# Patient Record
Sex: MALE | Race: WHITE | NOT HISPANIC OR LATINO | Employment: FULL TIME | URBAN - METROPOLITAN AREA
[De-identification: names, ages, dates, MRNs, and addresses within clinical notes are randomized per-mention and may not be internally consistent; named-entity substitution may affect disease eponyms.]

---

## 2017-11-15 ENCOUNTER — APPOINTMENT (OUTPATIENT)
Dept: SOCIAL WORK | Facility: CLINIC | Age: 47
End: 2017-11-15

## 2017-11-15 PROCEDURE — 90686 IIV4 VACC NO PRSV 0.5 ML IM: CPT | Performed by: REGISTERED NURSE

## 2018-01-29 ENCOUNTER — HOSPITAL ENCOUNTER (INPATIENT)
Facility: MEDICAL CENTER | Age: 48
LOS: 1 days | DRG: 871 | End: 2018-01-29
Attending: EMERGENCY MEDICINE | Admitting: HOSPITALIST

## 2018-01-29 ENCOUNTER — APPOINTMENT (OUTPATIENT)
Dept: RADIOLOGY | Facility: MEDICAL CENTER | Age: 48
DRG: 871 | End: 2018-01-29
Attending: EMERGENCY MEDICINE

## 2018-01-29 ENCOUNTER — RESOLUTE PROFESSIONAL BILLING HOSPITAL PROF FEE (OUTPATIENT)
Dept: HOSPITALIST | Facility: MEDICAL CENTER | Age: 48
End: 2018-01-29

## 2018-01-29 VITALS
DIASTOLIC BLOOD PRESSURE: 104 MMHG | OXYGEN SATURATION: 92 % | TEMPERATURE: 100.2 F | SYSTOLIC BLOOD PRESSURE: 167 MMHG | BODY MASS INDEX: 24.05 KG/M2 | HEART RATE: 112 BPM | HEIGHT: 74 IN | RESPIRATION RATE: 16 BRPM | WEIGHT: 187.39 LBS

## 2018-01-29 DIAGNOSIS — J45.901 ASTHMA WITH ACUTE EXACERBATION, UNSPECIFIED ASTHMA SEVERITY, UNSPECIFIED WHETHER PERSISTENT: ICD-10-CM

## 2018-01-29 DIAGNOSIS — J18.9 COMMUNITY ACQUIRED PNEUMONIA, UNSPECIFIED LATERALITY: ICD-10-CM

## 2018-01-29 PROBLEM — A41.9 SEPSIS (HCC): Status: ACTIVE | Noted: 2018-01-29

## 2018-01-29 LAB
ALBUMIN SERPL BCP-MCNC: 4.2 G/DL (ref 3.2–4.9)
ALBUMIN/GLOB SERPL: 1.3 G/DL
ALP SERPL-CCNC: 70 U/L (ref 30–99)
ALT SERPL-CCNC: 19 U/L (ref 2–50)
ANION GAP SERPL CALC-SCNC: 9 MMOL/L (ref 0–11.9)
AST SERPL-CCNC: 23 U/L (ref 12–45)
BASOPHILS # BLD AUTO: 1 % (ref 0–1.8)
BASOPHILS # BLD: 0.09 K/UL (ref 0–0.12)
BILIRUB SERPL-MCNC: 0.7 MG/DL (ref 0.1–1.5)
BNP SERPL-MCNC: 77 PG/ML (ref 0–100)
BUN SERPL-MCNC: 9 MG/DL (ref 8–22)
CALCIUM SERPL-MCNC: 9.2 MG/DL (ref 8.4–10.2)
CHLORIDE SERPL-SCNC: 101 MMOL/L (ref 96–112)
CO2 SERPL-SCNC: 27 MMOL/L (ref 20–33)
CREAT SERPL-MCNC: 1.09 MG/DL (ref 0.5–1.4)
EKG IMPRESSION: NORMAL
EOSINOPHIL # BLD AUTO: 0.51 K/UL (ref 0–0.51)
EOSINOPHIL NFR BLD: 5.7 % (ref 0–6.9)
ERYTHROCYTE [DISTWIDTH] IN BLOOD BY AUTOMATED COUNT: 41.4 FL (ref 35.9–50)
GLOBULIN SER CALC-MCNC: 3.3 G/DL (ref 1.9–3.5)
GLUCOSE SERPL-MCNC: 164 MG/DL (ref 65–99)
HCT VFR BLD AUTO: 47.3 % (ref 42–52)
HGB BLD-MCNC: 16 G/DL (ref 14–18)
IMM GRANULOCYTES # BLD AUTO: 0.06 K/UL (ref 0–0.11)
IMM GRANULOCYTES NFR BLD AUTO: 0.7 % (ref 0–0.9)
LIPASE SERPL-CCNC: 12 U/L (ref 7–58)
LYMPHOCYTES # BLD AUTO: 0.59 K/UL (ref 1–4.8)
LYMPHOCYTES NFR BLD: 6.6 % (ref 22–41)
MCH RBC QN AUTO: 29.6 PG (ref 27–33)
MCHC RBC AUTO-ENTMCNC: 33.8 G/DL (ref 33.7–35.3)
MCV RBC AUTO: 87.6 FL (ref 81.4–97.8)
MONOCYTES # BLD AUTO: 0.58 K/UL (ref 0–0.85)
MONOCYTES NFR BLD AUTO: 6.5 % (ref 0–13.4)
NEUTROPHILS # BLD AUTO: 7.05 K/UL (ref 1.82–7.42)
NEUTROPHILS NFR BLD: 79.5 % (ref 44–72)
NRBC # BLD AUTO: 0 K/UL
NRBC BLD-RTO: 0 /100 WBC
PLATELET # BLD AUTO: 246 K/UL (ref 164–446)
PMV BLD AUTO: 10.2 FL (ref 9–12.9)
POTASSIUM SERPL-SCNC: 3.8 MMOL/L (ref 3.6–5.5)
PROT SERPL-MCNC: 7.5 G/DL (ref 6–8.2)
RBC # BLD AUTO: 5.4 M/UL (ref 4.7–6.1)
SODIUM SERPL-SCNC: 137 MMOL/L (ref 135–145)
TROPONIN I SERPL-MCNC: <0.02 NG/ML (ref 0–0.04)
WBC # BLD AUTO: 8.9 K/UL (ref 4.8–10.8)

## 2018-01-29 PROCEDURE — 94760 N-INVAS EAR/PLS OXIMETRY 1: CPT

## 2018-01-29 PROCEDURE — 83690 ASSAY OF LIPASE: CPT

## 2018-01-29 PROCEDURE — 700101 HCHG RX REV CODE 250

## 2018-01-29 PROCEDURE — 87040 BLOOD CULTURE FOR BACTERIA: CPT

## 2018-01-29 PROCEDURE — 85025 COMPLETE CBC W/AUTO DIFF WBC: CPT

## 2018-01-29 PROCEDURE — 700105 HCHG RX REV CODE 258: Performed by: EMERGENCY MEDICINE

## 2018-01-29 PROCEDURE — 84145 PROCALCITONIN (PCT): CPT

## 2018-01-29 PROCEDURE — 84484 ASSAY OF TROPONIN QUANT: CPT

## 2018-01-29 PROCEDURE — 36415 COLL VENOUS BLD VENIPUNCTURE: CPT

## 2018-01-29 PROCEDURE — 304561 HCHG STAT O2

## 2018-01-29 PROCEDURE — 71045 X-RAY EXAM CHEST 1 VIEW: CPT

## 2018-01-29 PROCEDURE — 94640 AIRWAY INHALATION TREATMENT: CPT

## 2018-01-29 PROCEDURE — 83880 ASSAY OF NATRIURETIC PEPTIDE: CPT

## 2018-01-29 PROCEDURE — 99223 1ST HOSP IP/OBS HIGH 75: CPT | Performed by: HOSPITALIST

## 2018-01-29 PROCEDURE — 99284 EMERGENCY DEPT VISIT MOD MDM: CPT

## 2018-01-29 PROCEDURE — 80053 COMPREHEN METABOLIC PANEL: CPT

## 2018-01-29 PROCEDURE — 93005 ELECTROCARDIOGRAM TRACING: CPT

## 2018-01-29 PROCEDURE — 700111 HCHG RX REV CODE 636 W/ 250 OVERRIDE (IP): Performed by: EMERGENCY MEDICINE

## 2018-01-29 PROCEDURE — 96374 THER/PROPH/DIAG INJ IV PUSH: CPT

## 2018-01-29 PROCEDURE — 93005 ELECTROCARDIOGRAM TRACING: CPT | Performed by: EMERGENCY MEDICINE

## 2018-01-29 PROCEDURE — 306637 HCHG MISC ORTHO ITEM RC 0274

## 2018-01-29 RX ORDER — PROMETHAZINE HYDROCHLORIDE 25 MG/1
12.5-25 TABLET ORAL EVERY 4 HOURS PRN
Status: DISCONTINUED | OUTPATIENT
Start: 2018-01-29 | End: 2018-01-29 | Stop reason: HOSPADM

## 2018-01-29 RX ORDER — ONDANSETRON 4 MG/1
4 TABLET, ORALLY DISINTEGRATING ORAL EVERY 4 HOURS PRN
Status: DISCONTINUED | OUTPATIENT
Start: 2018-01-29 | End: 2018-01-29 | Stop reason: HOSPADM

## 2018-01-29 RX ORDER — SODIUM CHLORIDE 9 MG/ML
INJECTION, SOLUTION INTRAVENOUS CONTINUOUS
Status: DISCONTINUED | OUTPATIENT
Start: 2018-01-29 | End: 2018-01-29 | Stop reason: HOSPADM

## 2018-01-29 RX ORDER — ONDANSETRON 2 MG/ML
4 INJECTION INTRAMUSCULAR; INTRAVENOUS EVERY 4 HOURS PRN
Status: DISCONTINUED | OUTPATIENT
Start: 2018-01-29 | End: 2018-01-29 | Stop reason: HOSPADM

## 2018-01-29 RX ORDER — POLYETHYLENE GLYCOL 3350 17 G/17G
1 POWDER, FOR SOLUTION ORAL
Status: DISCONTINUED | OUTPATIENT
Start: 2018-01-29 | End: 2018-01-29 | Stop reason: HOSPADM

## 2018-01-29 RX ORDER — GUAIFENESIN/DEXTROMETHORPHAN 100-10MG/5
10 SYRUP ORAL EVERY 6 HOURS PRN
Status: DISCONTINUED | OUTPATIENT
Start: 2018-01-29 | End: 2018-01-29 | Stop reason: HOSPADM

## 2018-01-29 RX ORDER — ACETAMINOPHEN 325 MG/1
650 TABLET ORAL EVERY 6 HOURS PRN
Status: DISCONTINUED | OUTPATIENT
Start: 2018-01-29 | End: 2018-01-29 | Stop reason: HOSPADM

## 2018-01-29 RX ORDER — DOXYCYCLINE 100 MG/1
100 CAPSULE ORAL 2 TIMES DAILY
Qty: 20 CAP | Refills: 0 | Status: SHIPPED | OUTPATIENT
Start: 2018-01-29 | End: 2023-05-15

## 2018-01-29 RX ORDER — BISACODYL 10 MG
10 SUPPOSITORY, RECTAL RECTAL
Status: DISCONTINUED | OUTPATIENT
Start: 2018-01-29 | End: 2018-01-29 | Stop reason: HOSPADM

## 2018-01-29 RX ORDER — AMOXICILLIN 250 MG
2 CAPSULE ORAL 2 TIMES DAILY
Status: DISCONTINUED | OUTPATIENT
Start: 2018-01-29 | End: 2018-01-29 | Stop reason: HOSPADM

## 2018-01-29 RX ORDER — AZITHROMYCIN 250 MG/1
500 TABLET, FILM COATED ORAL ONCE
Status: DISCONTINUED | OUTPATIENT
Start: 2018-01-29 | End: 2018-01-29 | Stop reason: HOSPADM

## 2018-01-29 RX ORDER — IPRATROPIUM BROMIDE AND ALBUTEROL SULFATE 2.5; .5 MG/3ML; MG/3ML
SOLUTION RESPIRATORY (INHALATION)
Status: COMPLETED
Start: 2018-01-29 | End: 2018-01-29

## 2018-01-29 RX ORDER — DEXTROSE MONOHYDRATE 100 MG/ML
INJECTION, SOLUTION INTRAVENOUS
Status: COMPLETED
Start: 2018-01-29 | End: 2018-01-29

## 2018-01-29 RX ORDER — AZITHROMYCIN 250 MG/1
250 TABLET, FILM COATED ORAL EVERY 24 HOURS
Status: DISCONTINUED | OUTPATIENT
Start: 2018-01-30 | End: 2018-01-29 | Stop reason: HOSPADM

## 2018-01-29 RX ORDER — PROMETHAZINE HYDROCHLORIDE 25 MG/1
12.5-25 SUPPOSITORY RECTAL EVERY 4 HOURS PRN
Status: DISCONTINUED | OUTPATIENT
Start: 2018-01-29 | End: 2018-01-29 | Stop reason: HOSPADM

## 2018-01-29 RX ORDER — SODIUM CHLORIDE 9 MG/ML
1000 INJECTION, SOLUTION INTRAVENOUS
Status: DISCONTINUED | OUTPATIENT
Start: 2018-01-29 | End: 2018-01-29 | Stop reason: HOSPADM

## 2018-01-29 RX ORDER — TEMAZEPAM 15 MG/1
15 CAPSULE ORAL
Status: DISCONTINUED | OUTPATIENT
Start: 2018-01-29 | End: 2018-01-29 | Stop reason: HOSPADM

## 2018-01-29 RX ORDER — SODIUM CHLORIDE 9 MG/ML
30 INJECTION, SOLUTION INTRAVENOUS
Status: DISCONTINUED | OUTPATIENT
Start: 2018-01-29 | End: 2018-01-29 | Stop reason: HOSPADM

## 2018-01-29 RX ORDER — CEFTRIAXONE 2 G/1
2 INJECTION, POWDER, FOR SOLUTION INTRAMUSCULAR; INTRAVENOUS ONCE
Status: DISCONTINUED | OUTPATIENT
Start: 2018-01-29 | End: 2018-01-29 | Stop reason: HOSPADM

## 2018-01-29 RX ORDER — SODIUM CHLORIDE 9 MG/ML
1000 INJECTION, SOLUTION INTRAVENOUS ONCE
Status: COMPLETED | OUTPATIENT
Start: 2018-01-29 | End: 2018-01-29

## 2018-01-29 RX ADMIN — IPRATROPIUM BROMIDE AND ALBUTEROL SULFATE 3 ML: .5; 3 SOLUTION RESPIRATORY (INHALATION) at 16:07

## 2018-01-29 RX ADMIN — SODIUM CHLORIDE 1000 ML: 9 INJECTION, SOLUTION INTRAVENOUS at 17:31

## 2018-01-29 ASSESSMENT — ENCOUNTER SYMPTOMS
PSYCHIATRIC NEGATIVE: 1
BRUISES/BLEEDS EASILY: 0
LOSS OF CONSCIOUSNESS: 0
DEPRESSION: 0
NERVOUS/ANXIOUS: 0
NEUROLOGICAL NEGATIVE: 1
GASTROINTESTINAL NEGATIVE: 1
ABDOMINAL PAIN: 0
CHILLS: 0
MYALGIAS: 0
HEMOPTYSIS: 0
VOMITING: 0
FEVER: 0
CARDIOVASCULAR NEGATIVE: 1
COUGH: 1
DIZZINESS: 0
WEIGHT LOSS: 0
SPUTUM PRODUCTION: 1
FLANK PAIN: 0
MUSCULOSKELETAL NEGATIVE: 1
SHORTNESS OF BREATH: 1
BACK PAIN: 0
WHEEZING: 1
NAUSEA: 0
WEAKNESS: 0
CONSTITUTIONAL NEGATIVE: 1

## 2018-01-29 ASSESSMENT — PAIN SCALES - GENERAL
PAINLEVEL_OUTOF10: 0
PAINLEVEL_OUTOF10: 6

## 2018-01-29 NOTE — ED NOTES
"Chief Complaint   Patient presents with   • Respiratory Distress     started two days ago   • Chest Pain     started two days ago, described as \"It hurts with all this breathing\"     Resp (!) 24   Ht 1.88 m (6' 2\")   SpO2 (!) 74%     Pt taken to Room 10 after EKG  "

## 2018-01-30 LAB — PROCALCITONIN SERPL-MCNC: 0.08 NG/ML

## 2018-01-30 NOTE — FLOWSHEET NOTE
01/29/18 1607   Interdisciplinary Plan of Care-Outcomes    Bronchodilator Outcome Diminished Wheezing and Volume of Air Movement Increased   Education   Education Yes - Pt. / Family has been Instructed in use of Respiratory Medications and Adverse Reactions   RT Assessment of Delivered Medications   Evaluation of Medication Delivery Daily Yes-- Pt /Family has been Instructed in use of Respiratory Medications and Adverse Reactions   SVN Group   #SVN Performed Yes   Given By: Mouthpiece   Respiratory WDL   Respiratory (WDL) X   Chest Exam   Respiration (!) 27   Pulse (!) 114   Heart Rate (Monitored) (!) 113   Breath Sounds   RUL Breath Sounds Clear   RML Breath Sounds Diminished   RLL Breath Sounds Diminished   VICKY Breath Sounds Clear   LLL Breath Sounds Diminished   Secretions   Cough Moist;Productive   How Sputum Obtained Spontaneous   Oximetry   #Pulse Oximetry (Single Determination) Yes   Continuous Oximetry Yes   Oxygen   Pulse Oximetry 95 %   O2 (LPM) 5   O2 Daily Delivery Respiratory  Silicone Nasal Cannula

## 2018-01-30 NOTE — ED NOTES
Pt assessed, reports feeling much better after RT. Call light within reach, will cont to monitor.

## 2018-01-30 NOTE — ED PROVIDER NOTES
"ED Provider Note    CHIEF COMPLAINT  Chief Complaint   Patient presents with   • Respiratory Distress     started two days ago   • Chest Pain     started two days ago, described as \"It hurts with all this breathing\"       HPI  Vishal Parrish is a 47 y.o. male here for evaluation of cough congestion, and fast heart rate. Patient states that he has had productive cough for approximately 2-3 days, but has no vomiting. He isn't having fevers at home, but no abdominal pain.    PAST MEDICAL HISTORY   has a past medical history of Asthma.    SOCIAL HISTORY  Social History     Social History Main Topics   • Smoking status: Never Smoker   • Smokeless tobacco: Not on file   • Alcohol use No   • Drug use: No   • Sexual activity: Not on file       SURGICAL HISTORY  patient denies any surgical history    CURRENT MEDICATIONS  Home Medications     Reviewed by Olivia Woods (Pharmacy Tech) on 01/29/18 at 1717  Med List Status: Complete   Medication Last Dose Status        Patient Leeroy Taking any Medications                       ALLERGIES  No Known Allergies    REVIEW OF SYSTEMS  See HPI for further details. Review of systems as above, otherwise all other systems are negative.     PHYSICAL EXAM  Constitutional: Well developed, well nourished. mild acute distress.  HEENT: Normocephalic, atraumatic. Posterior pharynx clear and moist.  Eyes:  EOMI. Normal sclera.  Neck: Supple, Full range of motion, nontender.  Chest/Pulmonary: clear to ausculation. Symmetrical expansion.   Cardio: tachycardic rate and rhythm with no murmur.   Abdomen: Soft, nontender. No peritoneal signs. No guarding. No palpable masses.  Back: No CVA tenderness, nontender midline, no step offs.  Musculoskeletal: No deformity, no edema, neurovascular intact.   Neuro: Clear speech, appropriate, cooperative, cranial nerves II-XII grossly intact.  Psych: Normal mood and affect    Results for orders placed or performed during the hospital encounter of 01/29/18 "   CBC w/ Differential   Result Value Ref Range    WBC 8.9 4.8 - 10.8 K/uL    RBC 5.40 4.70 - 6.10 M/uL    Hemoglobin 16.0 14.0 - 18.0 g/dL    Hematocrit 47.3 42.0 - 52.0 %    MCV 87.6 81.4 - 97.8 fL    MCH 29.6 27.0 - 33.0 pg    MCHC 33.8 33.7 - 35.3 g/dL    RDW 41.4 35.9 - 50.0 fL    Platelet Count 246 164 - 446 K/uL    MPV 10.2 9.0 - 12.9 fL    Neutrophils-Polys 79.50 (H) 44.00 - 72.00 %    Lymphocytes 6.60 (L) 22.00 - 41.00 %    Monocytes 6.50 0.00 - 13.40 %    Eosinophils 5.70 0.00 - 6.90 %    Basophils 1.00 0.00 - 1.80 %    Immature Granulocytes 0.70 0.00 - 0.90 %    Nucleated RBC 0.00 /100 WBC    Neutrophils (Absolute) 7.05 1.82 - 7.42 K/uL    Lymphs (Absolute) 0.59 (L) 1.00 - 4.80 K/uL    Monos (Absolute) 0.58 0.00 - 0.85 K/uL    Eos (Absolute) 0.51 0.00 - 0.51 K/uL    Baso (Absolute) 0.09 0.00 - 0.12 K/uL    Immature Granulocytes (abs) 0.06 0.00 - 0.11 K/uL    NRBC (Absolute) 0.00 K/uL   Complete Metabolic Panel (CMP)   Result Value Ref Range    Sodium 137 135 - 145 mmol/L    Potassium 3.8 3.6 - 5.5 mmol/L    Chloride 101 96 - 112 mmol/L    Co2 27 20 - 33 mmol/L    Anion Gap 9.0 0.0 - 11.9    Glucose 164 (H) 65 - 99 mg/dL    Bun 9 8 - 22 mg/dL    Creatinine 1.09 0.50 - 1.40 mg/dL    Calcium 9.2 8.4 - 10.2 mg/dL    AST(SGOT) 23 12 - 45 U/L    ALT(SGPT) 19 2 - 50 U/L    Alkaline Phosphatase 70 30 - 99 U/L    Total Bilirubin 0.7 0.1 - 1.5 mg/dL    Albumin 4.2 3.2 - 4.9 g/dL    Total Protein 7.5 6.0 - 8.2 g/dL    Globulin 3.3 1.9 - 3.5 g/dL    A-G Ratio 1.3 g/dL   Troponin STAT   Result Value Ref Range    Troponin I <0.02 0.00 - 0.04 ng/mL   Btype Natriuretic Peptide   Result Value Ref Range    B Natriuretic Peptide 77 0 - 100 pg/mL   Lipase   Result Value Ref Range    Lipase 12 7 - 58 U/L   ESTIMATED GFR   Result Value Ref Range    GFR If African American >60 >60 mL/min/1.73 m 2    GFR If Non African American >60 >60 mL/min/1.73 m 2   EKG (NOW)   Result Value Ref Range    Report       [facility]  [facility] Emergency Dept.    Test Date:  2018  Pt Name:    BILLY BILLINGS                Department: EDS  MRN:        [id number]                      Room:       -ROOM 10  Gender:     Male                         Technician: ABHISHEK  :        1970                   Requested By:ER TRIAGE PROTOCOL  Order #:    [id number]                    Reading MD:    Measurements  Intervals                                Axis  Rate:       119                          P:          75  NV:         161                          QRS:        96  QRSD:       98                           T:          -1  QT:         319  QTc:        449    Interpretive Statements  Sinus tachycardia  Borderline right axis deviation  Probable left ventricular hypertrophy  No previous ECG available for comparison       DX-CHEST-PORTABLE (1 VIEW)   Final Result      Improved right suprahilar patchy opacity could reflect new consolidation. There is some central bronchial wall thickening which supports a diagnosis of reactive airways disease/asthma            PROCEDURES     MEDICAL RECORD  I have reviewed patient's medical record and pertinent results are listed above.    COURSE & MEDICAL DECISION MAKING  I have reviewed any medical record information, laboratory studies and radiographic results as noted above.    CRITICAL CARE  The very real possibility of a deterioration of this patient's condition required the highest level of my preparedness for sudden, emergent intervention.  I provided critical care services, which included medication orders, frequent reevaluations of the patient's condition and response to treatment, ordering and reviewing test results, and discussing the case with various consultants.  The critical care time associated with the care of the patient was 35 minutes. Review chart for interventions. This time is exclusive of any other billable procedures.       5:46 PM  Patient will be admitted for pneumonia and respiratory  distress        FINAL IMPRESSION  1. Community acquired pneumonia, unspecified laterality    2.      Critical care time 35 minutes.    Electronically signed by: Nader Santos, 1/29/2018 5:44 PM

## 2018-01-30 NOTE — ASSESSMENT & PLAN NOTE
This is severe sepsis with the following associated acute organ dysfunction(s): acute respiratory failure.   Sepsis orders set completed.  Start IV Unasyn and oral azithromycin.  30 mL/kg bolus started in the emergency department. Continue normal saline at 125 mL per hour.  Blood cultures done in emergency department follow results.

## 2018-01-30 NOTE — ASSESSMENT & PLAN NOTE
Sepsis protocol.  RT protocol.  Treating asthma.  Antibiotics, steroids Unasyn and azithromycin.

## 2018-01-30 NOTE — ED NOTES
"ERP notified pt wants to leave AMA, pt given extensive education on the risks and still wants to leave \"because I have a lot going on.\" Pt 92% on room air, still tachycardic. Educated on reasons to return to ED and verbalized understanding. Paperwork signed and on chart.   "

## 2018-01-30 NOTE — H&P
" Hospital Medicine History and Physical    Date of Service  1/29/2018    Chief Complaint  Chief Complaint   Patient presents with   • Respiratory Distress     started two days ago   • Chest Pain     started two days ago, described as \"It hurts with all this breathing\"       History of Presenting Illness  47 y.o. male who presented 1/29/2018 with difficulty breathing and coughing. This started about 2 days ago. He says he is coughing up green sputum. He has pain with the coughing and deep breathing. He has not been in the hospital before for pneumonia or asthma and does not take any medications on a regular basis. Patient has been chilled but no fever.   Primary Care Physician  Pcp Unknown    Consultants  None.    Code Status  Full code.    Review of Systems  Review of Systems   Constitutional: Negative.  Negative for chills, fever, malaise/fatigue and weight loss.   HENT: Negative.    Respiratory: Positive for cough, sputum production, shortness of breath and wheezing. Negative for hemoptysis.    Cardiovascular: Negative.  Negative for chest pain and leg swelling.   Gastrointestinal: Negative.  Negative for abdominal pain, nausea and vomiting.   Genitourinary: Negative.  Negative for dysuria and flank pain.   Musculoskeletal: Negative.  Negative for back pain and myalgias.   Neurological: Negative.  Negative for dizziness, loss of consciousness and weakness.   Endo/Heme/Allergies: Negative.  Does not bruise/bleed easily.   Psychiatric/Behavioral: Negative.  Negative for depression. The patient is not nervous/anxious.    All other systems reviewed and are negative.       Past Medical History  Past Medical History:   Diagnosis Date   • Asthma        Surgical History  No past surgical history on file.    Medications  No current facility-administered medications on file prior to encounter.      No current outpatient prescriptions on file prior to encounter.       Family History  Family History   Problem Relation Age of " Onset   • No Known Problems Mother    • No Known Problems Father        Social History  Social History   Substance Use Topics   • Smoking status: Never Smoker   • Smokeless tobacco: Not on file   • Alcohol use No       Allergies  No Known Allergies     Physical Exam  Laboratory   Hemodynamics  Temp (24hrs), Av.9 °C (100.2 °F), Min:37.9 °C (100.2 °F), Max:37.9 °C (100.2 °F)   Temperature: 37.9 °C (100.2 °F)  Pulse  Av  Min: 114  Max: 124 Heart Rate (Monitored): (!) 116  Blood Pressure: (!) 167/104, NIBP: 157/91      Respiratory      Respiration: 15, Pulse Oximetry: 97 %, O2 Daily Delivery Respiratory : Silicone Nasal Cannula     Given By:: Mouthpiece, Work Of Breathing / Effort: Moderate  RUL Breath Sounds: Clear, RML Breath Sounds: Diminished, RLL Breath Sounds: Diminished, VICKY Breath Sounds: Clear, LLL Breath Sounds: Diminished    Physical Exam   Constitutional: He is oriented to person, place, and time. He appears well-developed and well-nourished. No distress.   HENT:   Head: Normocephalic and atraumatic.   Nose: Nose normal.   Mouth/Throat: Oropharynx is clear and moist. Mucous membranes are dry. No oropharyngeal exudate.   Eyes: Conjunctivae are normal. Right eye exhibits no discharge. Left eye exhibits no discharge. No scleral icterus.   Neck: Normal range of motion. Neck supple. No JVD present. No tracheal deviation present.   Cardiovascular: Normal rate, regular rhythm, normal heart sounds and intact distal pulses.    Pulses:       Radial pulses are 2+ on the right side, and 2+ on the left side.   Pulmonary/Chest: Effort normal. No stridor. No respiratory distress. He has wheezes (few distant anterior). He has rales (right lower lung field). He exhibits no tenderness.   Abdominal: Soft. Bowel sounds are normal. He exhibits no distension. There is no tenderness.   Musculoskeletal: He exhibits no edema or tenderness.   Lymphadenopathy:     He has no cervical adenopathy.   Neurological: He is alert  and oriented to person, place, and time. No cranial nerve deficit. He exhibits normal muscle tone.   Skin: Skin is warm and dry. No rash noted. He is not diaphoretic. No erythema. No pallor.   Psychiatric: He has a normal mood and affect. His behavior is normal.   Nursing note and vitals reviewed.      Recent Labs      01/29/18   1550   WBC  8.9   RBC  5.40   HEMOGLOBIN  16.0   HEMATOCRIT  47.3   MCV  87.6   MCH  29.6   MCHC  33.8   RDW  41.4   PLATELETCT  246   MPV  10.2     Recent Labs      01/29/18   1550   SODIUM  137   POTASSIUM  3.8   CHLORIDE  101   CO2  27   GLUCOSE  164*   BUN  9   CREATININE  1.09   CALCIUM  9.2     Recent Labs      01/29/18   1550   ALTSGPT  19   ASTSGOT  23   ALKPHOSPHAT  70   TBILIRUBIN  0.7   LIPASE  12   GLUCOSE  164*         Recent Labs      01/29/18   1550   BNPBTYPENAT  77         Lab Results   Component Value Date    TROPONINI <0.02 01/29/2018     Urinalysis:  No results found for: SPECGRAVITY, GLUCOSEUR, KETONES, NITRITE, WBCURINE, RBCURINE, BACTERIA, EPITHELCELL     Imaging  Chest xray:  Improved right suprahilar patchy opacity could reflect new consolidation. There is some central bronchial wall thickening which supports a diagnosis of reactive airways disease/asthma   Assessment/Plan     I anticipate this patient will require at least two midnights for appropriate medical management, necessitating inpatient admission.    Asthma- (present on admission)   Assessment & Plan    RT protocol, nebulizers as needed.  Improving with treatment does not need steroids at this time.        Sepsis (CMS-[facility])- (present on admission)   Assessment & Plan    This is severe sepsis with the following associated acute organ dysfunction(s): acute respiratory failure.   Sepsis orders set completed.  Start IV Unasyn and oral azithromycin.  30 mL/kg bolus started in the emergency department. Continue normal saline at 125 mL per hour.  Blood cultures done in emergency department follow results.         CAP (community acquired pneumonia)- (present on admission)   Assessment & Plan    Sepsis protocol.  RT protocol.  Treating asthma.  Antibiotics, steroids Unasyn and azithromycin.              VTE prophylaxis: SCDs.

## 2018-02-03 LAB
BACTERIA BLD CULT: NORMAL
BACTERIA BLD CULT: NORMAL
SIGNIFICANT IND 70042: NORMAL
SIGNIFICANT IND 70042: NORMAL
SITE SITE: NORMAL
SITE SITE: NORMAL
SOURCE SOURCE: NORMAL
SOURCE SOURCE: NORMAL

## 2023-05-15 ENCOUNTER — OFFICE VISIT (OUTPATIENT)
Dept: URGENT CARE | Facility: CLINIC | Age: 53
End: 2023-05-15
Payer: COMMERCIAL

## 2023-05-15 DIAGNOSIS — R03.0 ELEVATED BLOOD PRESSURE READING IN OFFICE WITH WHITE COAT SYNDROME, WITHOUT DIAGNOSIS OF HYPERTENSION: ICD-10-CM

## 2023-05-15 DIAGNOSIS — L08.9 INFECTED FINGER LACERATION, INITIAL ENCOUNTER: Primary | ICD-10-CM

## 2023-05-15 DIAGNOSIS — Z23 NEED FOR TETANUS BOOSTER: ICD-10-CM

## 2023-05-15 DIAGNOSIS — S61.219A INFECTED FINGER LACERATION, INITIAL ENCOUNTER: Primary | ICD-10-CM

## 2023-05-15 PROCEDURE — 90715 TDAP VACCINE 7 YRS/> IM: CPT | Performed by: NURSE PRACTITIONER

## 2023-05-15 PROCEDURE — 90471 IMMUNIZATION ADMIN: CPT | Performed by: NURSE PRACTITIONER

## 2023-05-15 PROCEDURE — 99203 OFFICE O/P NEW LOW 30 MIN: CPT | Mod: 25 | Performed by: NURSE PRACTITIONER

## 2023-05-15 RX ORDER — SULFAMETHOXAZOLE AND TRIMETHOPRIM 800; 160 MG/1; MG/1
1 TABLET ORAL 2 TIMES DAILY
Qty: 14 TABLET | Refills: 0 | Status: SHIPPED | OUTPATIENT
Start: 2023-05-15 | End: 2023-05-22

## 2023-05-15 RX ORDER — CEPHALEXIN 500 MG/1
500 CAPSULE ORAL 3 TIMES DAILY
Qty: 21 CAPSULE | Refills: 0 | Status: SHIPPED | OUTPATIENT
Start: 2023-05-15 | End: 2023-05-22

## 2023-05-18 VITALS
OXYGEN SATURATION: 98 % | BODY MASS INDEX: 25.21 KG/M2 | RESPIRATION RATE: 16 BRPM | HEART RATE: 95 BPM | HEIGHT: 74 IN | TEMPERATURE: 97.9 F | WEIGHT: 196.4 LBS

## 2023-05-18 NOTE — PROGRESS NOTES
"Subjective:     Vishal Parrish is a 52 y.o. male who presents for Other (Right index finger, swelling, numbness, tingling sensation once in the while, pain)      Other      Pt presents for evaluation of a new problem. Vishal is a pleasant 52-year-old male presents urgent care today with complaints of an infected laceration after injuring himself 4 days ago.  He is now endorsing swelling, redness, pain and mild drainage.  His tetanus vaccination is not up-to-date.  He has not been using any medication for his treatment.    ROS    PMH:   Past Medical History:   Diagnosis Date    Asthma      ALLERGIES: No Known Allergies  SURGHX: No past surgical history on file.  SOCHX:   Social History     Socioeconomic History    Marital status: Single   Tobacco Use    Smoking status: Never   Substance and Sexual Activity    Alcohol use: No    Drug use: No     FH:   Family History   Problem Relation Age of Onset    No Known Problems Mother     No Known Problems Father          Objective:   Pulse 95   Temp 36.6 °C (97.9 °F) (Temporal)   Resp 16   Ht 1.88 m (6' 2\")   Wt 89.1 kg (196 lb 6.4 oz)   SpO2 98%   BMI 25.22 kg/m²   /84  Physical Exam  Vitals and nursing note reviewed.   Constitutional:       General: He is not in acute distress.     Appearance: Normal appearance. He is normal weight. He is not ill-appearing or toxic-appearing.   HENT:      Head: Normocephalic.      Right Ear: External ear normal.      Left Ear: External ear normal.      Nose: Nose normal.      Mouth/Throat:      Mouth: Mucous membranes are moist.   Eyes:      General:         Right eye: No discharge.         Left eye: No discharge.      Extraocular Movements: Extraocular movements intact.      Conjunctiva/sclera: Conjunctivae normal.      Pupils: Pupils are equal, round, and reactive to light.   Pulmonary:      Effort: Pulmonary effort is normal.      Breath sounds: Normal breath sounds.   Abdominal:      General: Abdomen is flat.   Musculoskeletal: "         General: Normal range of motion.      Cervical back: Normal range of motion and neck supple. No rigidity.   Lymphadenopathy:      Cervical: No cervical adenopathy.   Skin:     General: Skin is warm and dry.      Comments: Superficial laceration present to right index finger at PIP joint.  Laceration is approximately 3 cm in length and actively drainage.  There is pain with outpatient.  Range of motion decreased due to discomfort.   Neurological:      General: No focal deficit present.      Mental Status: He is alert and oriented to person, place, and time. Mental status is at baseline.   Psychiatric:         Mood and Affect: Mood normal.         Behavior: Behavior normal.         Judgment: Judgment normal.           Assessment/Plan:   Assessment    1. Infected finger laceration, initial encounter  cephALEXin (KEFLEX) 500 MG Cap    sulfamethoxazole-trimethoprim (BACTRIM DS) 800-160 MG tablet    Tdap =>8yo IM      2. Need for tetanus booster  Tdap =>8yo IM      3. Elevated blood pressure reading in office with white coat syndrome, without diagnosis of hypertension          Unfortunately, due to timeframe I am unable to repair laceration.  This will have to heal by secondary intent.  He is suffering from an infection.  Cephalexin and Bactrim sent to pharmacy for broad range coverage.  Wound was thoroughly cleansed with Hibiclens and normal saline and dressed with Polysporin, nonadherent gauze and Coban.  Patient to return for worsening or persistent symptoms.  AVS handout given and reviewed with patient. Pt educated on red flags and when to seek treatment back in ER or UC.

## 2023-06-22 ENCOUNTER — APPOINTMENT (OUTPATIENT)
Dept: RADIOLOGY | Facility: MEDICAL CENTER | Age: 53
End: 2023-06-22
Attending: EMERGENCY MEDICINE
Payer: COMMERCIAL

## 2023-06-22 ENCOUNTER — APPOINTMENT (OUTPATIENT)
Dept: RADIOLOGY | Facility: MEDICAL CENTER | Age: 53
DRG: 988 | End: 2023-06-22
Attending: EMERGENCY MEDICINE
Payer: COMMERCIAL

## 2023-06-22 ENCOUNTER — HOSPITAL ENCOUNTER (INPATIENT)
Facility: MEDICAL CENTER | Age: 53
LOS: 4 days | DRG: 988 | End: 2023-06-26
Attending: EMERGENCY MEDICINE | Admitting: INTERNAL MEDICINE
Payer: COMMERCIAL

## 2023-06-22 ENCOUNTER — HOSPITAL ENCOUNTER (EMERGENCY)
Facility: MEDICAL CENTER | Age: 53
End: 2023-06-22
Attending: EMERGENCY MEDICINE
Payer: COMMERCIAL

## 2023-06-22 VITALS
HEART RATE: 62 BPM | TEMPERATURE: 99 F | WEIGHT: 198.85 LBS | DIASTOLIC BLOOD PRESSURE: 87 MMHG | BODY MASS INDEX: 25.53 KG/M2 | RESPIRATION RATE: 18 BRPM | SYSTOLIC BLOOD PRESSURE: 140 MMHG | OXYGEN SATURATION: 99 %

## 2023-06-22 DIAGNOSIS — M10.9 ACUTE GOUT OF RIGHT HAND, UNSPECIFIED CAUSE: ICD-10-CM

## 2023-06-22 DIAGNOSIS — L03.119 CELLULITIS OF HAND: ICD-10-CM

## 2023-06-22 DIAGNOSIS — L03.119 CELLULITIS AND ABSCESS OF HAND: ICD-10-CM

## 2023-06-22 DIAGNOSIS — A41.9 SEPSIS, DUE TO UNSPECIFIED ORGANISM, UNSPECIFIED WHETHER ACUTE ORGAN DYSFUNCTION PRESENT (HCC): ICD-10-CM

## 2023-06-22 DIAGNOSIS — L02.519 CELLULITIS AND ABSCESS OF HAND: ICD-10-CM

## 2023-06-22 DIAGNOSIS — S63.501A SPRAIN OF RIGHT WRIST, INITIAL ENCOUNTER: ICD-10-CM

## 2023-06-22 LAB
ALBUMIN SERPL BCP-MCNC: 4.6 G/DL (ref 3.2–4.9)
ALBUMIN/GLOB SERPL: 1.7 G/DL
ALP SERPL-CCNC: 85 U/L (ref 30–99)
ALT SERPL-CCNC: 34 U/L (ref 2–50)
ANION GAP SERPL CALC-SCNC: 13 MMOL/L (ref 7–16)
AST SERPL-CCNC: 39 U/L (ref 12–45)
BASOPHILS # BLD AUTO: 0.3 % (ref 0–1.8)
BASOPHILS # BLD: 0.06 K/UL (ref 0–0.12)
BILIRUB SERPL-MCNC: 1 MG/DL (ref 0.1–1.5)
BUN SERPL-MCNC: 9 MG/DL (ref 8–22)
CALCIUM ALBUM COR SERPL-MCNC: 9.3 MG/DL (ref 8.5–10.5)
CALCIUM SERPL-MCNC: 9.8 MG/DL (ref 8.5–10.5)
CHLORIDE SERPL-SCNC: 97 MMOL/L (ref 96–112)
CK SERPL-CCNC: 367 U/L (ref 0–154)
CO2 SERPL-SCNC: 24 MMOL/L (ref 20–33)
CREAT SERPL-MCNC: 1.09 MG/DL (ref 0.5–1.4)
CRP SERPL HS-MCNC: 6.78 MG/DL (ref 0–0.75)
EOSINOPHIL # BLD AUTO: 0.02 K/UL (ref 0–0.51)
EOSINOPHIL NFR BLD: 0.1 % (ref 0–6.9)
ERYTHROCYTE [DISTWIDTH] IN BLOOD BY AUTOMATED COUNT: 41.8 FL (ref 35.9–50)
ERYTHROCYTE [SEDIMENTATION RATE] IN BLOOD BY WESTERGREN METHOD: 9 MM/HOUR (ref 0–20)
GFR SERPLBLD CREATININE-BSD FMLA CKD-EPI: 82 ML/MIN/1.73 M 2
GLOBULIN SER CALC-MCNC: 2.7 G/DL (ref 1.9–3.5)
GLUCOSE SERPL-MCNC: 122 MG/DL (ref 65–99)
HCT VFR BLD AUTO: 41.8 % (ref 42–52)
HGB BLD-MCNC: 14.9 G/DL (ref 14–18)
IMM GRANULOCYTES # BLD AUTO: 0.13 K/UL (ref 0–0.11)
IMM GRANULOCYTES NFR BLD AUTO: 0.6 % (ref 0–0.9)
LACTATE SERPL-SCNC: 1.8 MMOL/L (ref 0.5–2)
LYMPHOCYTES # BLD AUTO: 0.53 K/UL (ref 1–4.8)
LYMPHOCYTES NFR BLD: 2.6 % (ref 22–41)
MCH RBC QN AUTO: 31.7 PG (ref 27–33)
MCHC RBC AUTO-ENTMCNC: 35.6 G/DL (ref 32.3–36.5)
MCV RBC AUTO: 88.9 FL (ref 81.4–97.8)
MONOCYTES # BLD AUTO: 1.28 K/UL (ref 0–0.85)
MONOCYTES NFR BLD AUTO: 6.2 % (ref 0–13.4)
NEUTROPHILS # BLD AUTO: 18.56 K/UL (ref 1.82–7.42)
NEUTROPHILS NFR BLD: 90.2 % (ref 44–72)
NRBC # BLD AUTO: 0 K/UL
NRBC BLD-RTO: 0 /100 WBC (ref 0–0.2)
PLATELET # BLD AUTO: 231 K/UL (ref 164–446)
PMV BLD AUTO: 10.6 FL (ref 9–12.9)
POTASSIUM SERPL-SCNC: 3.5 MMOL/L (ref 3.6–5.5)
PROT SERPL-MCNC: 7.3 G/DL (ref 6–8.2)
RBC # BLD AUTO: 4.7 M/UL (ref 4.7–6.1)
SODIUM SERPL-SCNC: 134 MMOL/L (ref 135–145)
URATE SERPL-MCNC: 5.3 MG/DL (ref 2.5–8.3)
WBC # BLD AUTO: 20.6 K/UL (ref 4.8–10.8)

## 2023-06-22 PROCEDURE — 700111 HCHG RX REV CODE 636 W/ 250 OVERRIDE (IP): Performed by: EMERGENCY MEDICINE

## 2023-06-22 PROCEDURE — 84550 ASSAY OF BLOOD/URIC ACID: CPT

## 2023-06-22 PROCEDURE — 99223 1ST HOSP IP/OBS HIGH 75: CPT | Mod: 57 | Performed by: STUDENT IN AN ORGANIZED HEALTH CARE EDUCATION/TRAINING PROGRAM

## 2023-06-22 PROCEDURE — A9270 NON-COVERED ITEM OR SERVICE: HCPCS | Performed by: EMERGENCY MEDICINE

## 2023-06-22 PROCEDURE — 700102 HCHG RX REV CODE 250 W/ 637 OVERRIDE(OP): Performed by: EMERGENCY MEDICINE

## 2023-06-22 PROCEDURE — 770001 HCHG ROOM/CARE - MED/SURG/GYN PRIV*

## 2023-06-22 PROCEDURE — 700117 HCHG RX CONTRAST REV CODE 255: Performed by: EMERGENCY MEDICINE

## 2023-06-22 PROCEDURE — 83605 ASSAY OF LACTIC ACID: CPT

## 2023-06-22 PROCEDURE — 99222 1ST HOSP IP/OBS MODERATE 55: CPT | Performed by: INTERNAL MEDICINE

## 2023-06-22 PROCEDURE — 73130 X-RAY EXAM OF HAND: CPT | Mod: RT

## 2023-06-22 PROCEDURE — 85025 COMPLETE CBC W/AUTO DIFF WBC: CPT

## 2023-06-22 PROCEDURE — 700105 HCHG RX REV CODE 258: Performed by: INTERNAL MEDICINE

## 2023-06-22 PROCEDURE — 80053 COMPREHEN METABOLIC PANEL: CPT

## 2023-06-22 PROCEDURE — 85652 RBC SED RATE AUTOMATED: CPT

## 2023-06-22 PROCEDURE — 36415 COLL VENOUS BLD VENIPUNCTURE: CPT

## 2023-06-22 PROCEDURE — 73201 CT UPPER EXTREMITY W/DYE: CPT | Mod: RT

## 2023-06-22 PROCEDURE — 73100 X-RAY EXAM OF WRIST: CPT | Mod: RT

## 2023-06-22 PROCEDURE — 700102 HCHG RX REV CODE 250 W/ 637 OVERRIDE(OP): Performed by: INTERNAL MEDICINE

## 2023-06-22 PROCEDURE — 73090 X-RAY EXAM OF FOREARM: CPT | Mod: RT

## 2023-06-22 PROCEDURE — 99284 EMERGENCY DEPT VISIT MOD MDM: CPT | Mod: EDC

## 2023-06-22 PROCEDURE — 82550 ASSAY OF CK (CPK): CPT

## 2023-06-22 PROCEDURE — 96372 THER/PROPH/DIAG INJ SC/IM: CPT | Mod: EDC

## 2023-06-22 PROCEDURE — 96365 THER/PROPH/DIAG IV INF INIT: CPT

## 2023-06-22 PROCEDURE — 87040 BLOOD CULTURE FOR BACTERIA: CPT

## 2023-06-22 PROCEDURE — A9270 NON-COVERED ITEM OR SERVICE: HCPCS | Performed by: INTERNAL MEDICINE

## 2023-06-22 PROCEDURE — 700111 HCHG RX REV CODE 636 W/ 250 OVERRIDE (IP): Performed by: INTERNAL MEDICINE

## 2023-06-22 PROCEDURE — 96375 TX/PRO/DX INJ NEW DRUG ADDON: CPT

## 2023-06-22 PROCEDURE — 86140 C-REACTIVE PROTEIN: CPT

## 2023-06-22 PROCEDURE — 99285 EMERGENCY DEPT VISIT HI MDM: CPT

## 2023-06-22 RX ORDER — OXYCODONE HYDROCHLORIDE 5 MG/1
2.5 TABLET ORAL
Status: DISCONTINUED | OUTPATIENT
Start: 2023-06-22 | End: 2023-06-26 | Stop reason: HOSPADM

## 2023-06-22 RX ORDER — MORPHINE SULFATE 4 MG/ML
2 INJECTION INTRAVENOUS
Status: DISCONTINUED | OUTPATIENT
Start: 2023-06-22 | End: 2023-06-26 | Stop reason: HOSPADM

## 2023-06-22 RX ORDER — ACETAMINOPHEN 325 MG/1
650 TABLET ORAL EVERY 6 HOURS PRN
Status: DISCONTINUED | OUTPATIENT
Start: 2023-06-22 | End: 2023-06-26 | Stop reason: HOSPADM

## 2023-06-22 RX ORDER — NAPROXEN 500 MG/1
500 TABLET ORAL 2 TIMES DAILY WITH MEALS
Qty: 60 TABLET | Refills: 0 | Status: SHIPPED | OUTPATIENT
Start: 2023-06-22 | End: 2023-06-22

## 2023-06-22 RX ORDER — ENOXAPARIN SODIUM 100 MG/ML
40 INJECTION SUBCUTANEOUS DAILY
Status: DISCONTINUED | OUTPATIENT
Start: 2023-06-23 | End: 2023-06-26 | Stop reason: HOSPADM

## 2023-06-22 RX ORDER — OXYCODONE HYDROCHLORIDE 5 MG/1
5 TABLET ORAL
Status: DISCONTINUED | OUTPATIENT
Start: 2023-06-22 | End: 2023-06-26 | Stop reason: HOSPADM

## 2023-06-22 RX ORDER — BISACODYL 10 MG
10 SUPPOSITORY, RECTAL RECTAL
Status: DISCONTINUED | OUTPATIENT
Start: 2023-06-22 | End: 2023-06-26 | Stop reason: HOSPADM

## 2023-06-22 RX ORDER — PROMETHAZINE HYDROCHLORIDE 25 MG/1
12.5-25 SUPPOSITORY RECTAL EVERY 4 HOURS PRN
Status: DISCONTINUED | OUTPATIENT
Start: 2023-06-22 | End: 2023-06-26 | Stop reason: HOSPADM

## 2023-06-22 RX ORDER — ONDANSETRON 4 MG/1
4 TABLET, ORALLY DISINTEGRATING ORAL EVERY 4 HOURS PRN
Status: DISCONTINUED | OUTPATIENT
Start: 2023-06-22 | End: 2023-06-26 | Stop reason: HOSPADM

## 2023-06-22 RX ORDER — POLYETHYLENE GLYCOL 3350 17 G/17G
1 POWDER, FOR SOLUTION ORAL
Status: DISCONTINUED | OUTPATIENT
Start: 2023-06-22 | End: 2023-06-26 | Stop reason: HOSPADM

## 2023-06-22 RX ORDER — AMOXICILLIN 250 MG
2 CAPSULE ORAL 2 TIMES DAILY
Status: DISCONTINUED | OUTPATIENT
Start: 2023-06-23 | End: 2023-06-26 | Stop reason: HOSPADM

## 2023-06-22 RX ORDER — LABETALOL HYDROCHLORIDE 5 MG/ML
10 INJECTION, SOLUTION INTRAVENOUS EVERY 4 HOURS PRN
Status: DISCONTINUED | OUTPATIENT
Start: 2023-06-22 | End: 2023-06-26 | Stop reason: HOSPADM

## 2023-06-22 RX ORDER — KETOROLAC TROMETHAMINE 30 MG/ML
30 INJECTION, SOLUTION INTRAMUSCULAR; INTRAVENOUS ONCE
Status: COMPLETED | OUTPATIENT
Start: 2023-06-22 | End: 2023-06-22

## 2023-06-22 RX ORDER — CEFAZOLIN 2 G/1
2 INJECTION, POWDER, FOR SOLUTION INTRAMUSCULAR; INTRAVENOUS ONCE
Status: COMPLETED | OUTPATIENT
Start: 2023-06-22 | End: 2023-06-22

## 2023-06-22 RX ORDER — ONDANSETRON 2 MG/ML
4 INJECTION INTRAMUSCULAR; INTRAVENOUS EVERY 4 HOURS PRN
Status: DISCONTINUED | OUTPATIENT
Start: 2023-06-22 | End: 2023-06-26 | Stop reason: HOSPADM

## 2023-06-22 RX ORDER — OXYCODONE HYDROCHLORIDE AND ACETAMINOPHEN 5; 325 MG/1; MG/1
1 TABLET ORAL ONCE
Status: COMPLETED | OUTPATIENT
Start: 2023-06-22 | End: 2023-06-22

## 2023-06-22 RX ORDER — PROCHLORPERAZINE EDISYLATE 5 MG/ML
5-10 INJECTION INTRAMUSCULAR; INTRAVENOUS EVERY 4 HOURS PRN
Status: DISCONTINUED | OUTPATIENT
Start: 2023-06-22 | End: 2023-06-26 | Stop reason: HOSPADM

## 2023-06-22 RX ORDER — PROMETHAZINE HYDROCHLORIDE 25 MG/1
12.5-25 TABLET ORAL EVERY 4 HOURS PRN
Status: DISCONTINUED | OUTPATIENT
Start: 2023-06-22 | End: 2023-06-26 | Stop reason: HOSPADM

## 2023-06-22 RX ORDER — HYDROMORPHONE HYDROCHLORIDE 1 MG/ML
0.5 INJECTION, SOLUTION INTRAMUSCULAR; INTRAVENOUS; SUBCUTANEOUS ONCE
Status: COMPLETED | OUTPATIENT
Start: 2023-06-22 | End: 2023-06-22

## 2023-06-22 RX ORDER — POTASSIUM CHLORIDE 20 MEQ/1
40 TABLET, EXTENDED RELEASE ORAL ONCE
Status: COMPLETED | OUTPATIENT
Start: 2023-06-22 | End: 2023-06-22

## 2023-06-22 RX ADMIN — OXYCODONE HYDROCHLORIDE 5 MG: 5 TABLET ORAL at 22:03

## 2023-06-22 RX ADMIN — CEFAZOLIN 2 G: 2 INJECTION, POWDER, FOR SOLUTION INTRAMUSCULAR; INTRAVENOUS at 19:42

## 2023-06-22 RX ADMIN — HYDROMORPHONE HYDROCHLORIDE 0.5 MG: 1 INJECTION, SOLUTION INTRAMUSCULAR; INTRAVENOUS; SUBCUTANEOUS at 20:23

## 2023-06-22 RX ADMIN — OXYCODONE HYDROCHLORIDE AND ACETAMINOPHEN 1 TABLET: 5; 325 TABLET ORAL at 10:42

## 2023-06-22 RX ADMIN — IOHEXOL 100 ML: 350 INJECTION, SOLUTION INTRAVENOUS at 21:15

## 2023-06-22 RX ADMIN — OXYCODONE HYDROCHLORIDE AND ACETAMINOPHEN 1 TABLET: 5; 325 TABLET ORAL at 18:40

## 2023-06-22 RX ADMIN — POTASSIUM CHLORIDE 40 MEQ: 1500 TABLET, EXTENDED RELEASE ORAL at 21:58

## 2023-06-22 RX ADMIN — KETOROLAC TROMETHAMINE 30 MG: 30 INJECTION, SOLUTION INTRAMUSCULAR; INTRAVENOUS at 08:44

## 2023-06-22 RX ADMIN — VANCOMYCIN HYDROCHLORIDE 2250 MG: 5 INJECTION, POWDER, LYOPHILIZED, FOR SOLUTION INTRAVENOUS at 23:10

## 2023-06-22 ASSESSMENT — LIFESTYLE VARIABLES
EVER HAD A DRINK FIRST THING IN THE MORNING TO STEADY YOUR NERVES TO GET RID OF A HANGOVER: NO
TOTAL SCORE: 0
EVER FELT BAD OR GUILTY ABOUT YOUR DRINKING: NO
DOES PATIENT WANT TO STOP DRINKING: NO
HAVE YOU EVER FELT YOU SHOULD CUT DOWN ON YOUR DRINKING: NO
DO YOU DRINK ALCOHOL: NO
TOTAL SCORE: 0
HAVE PEOPLE ANNOYED YOU BY CRITICIZING YOUR DRINKING: NO
TOTAL SCORE: 0
CONSUMPTION TOTAL: INCOMPLETE

## 2023-06-22 ASSESSMENT — PAIN DESCRIPTION - PAIN TYPE: TYPE: ACUTE PAIN

## 2023-06-22 NOTE — ED PROVIDER NOTES
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 male who presents to the Emergency Department for evaluation of right hand pain.  Patient reports that over the last 3 days he has been doing a bunch of work with pulleys and has been pulling pulleys repetitively.  He states that with this motion his right thumb and right wrist have swollen up and are causing him significant discomfort.  He denies any fevers or chills. He reports moderate throbbing pain in his right hand radiating into his right wrist and forearm. Denies numbness, tingling or weakness.     EXTERNAL RECORDS REVIEWED  none pertinent     LIMITATION TO HISTORY   none    OUTSIDE HISTORIAN(S):  none      PAST MEDICAL HISTORY   has a past medical history of Asthma.    SURGICAL HISTORY  patient denies any surgical history    SOCIAL HISTORY  Social History     Tobacco Use    Smoking status: Never   Substance Use Topics    Alcohol use: No    Drug use: No      Social History     Substance and Sexual Activity   Drug Use No       FAMILY HISTORY  Family History   Problem Relation Age of Onset    No Known Problems Mother     No Known Problems Father        CURRENT MEDICATIONS  Home Medications       Reviewed by Deonna Hanna R.N. (Registered Nurse) on 06/22/23 at 0803  Med List Status: Partial     Medication Last Dose Status        Patient Leeroy Taking any Medications                           ALLERGIES  No Known Allergies    PHYSICAL EXAM  VITAL SIGNS: /80   Pulse 86   Temp 37.5 °C (99.5 °F) (Temporal)   Resp 20   Wt 90.2 kg (198 lb 13.7 oz)   SpO2 99%   BMI 25.53 kg/m²   Vitals reviewed by myself.  Physical Exam  Nursing note and vitals reviewed.  Constitutional: " Well-developed and well-nourished.  Appears uncomfortable  HENT: Head is normocephalic and atraumatic.  Eyes: extra-ocular movements intact  Cardiovascular: regular rate and regular rhythm.   Pulmonary/Chest: Normal respiratory effort  Musculoskeletal: Patient has swelling and tenderness noticed to the right thenar compartment and into the right wrist.  He is able to range his right wrist and right thumb although has some mild discomfort.  No overlying erythema  Neurological: Awake and alert. Sensation intact all distal fingertips.  Skin: Skin is warm and dry. No rash.       DIAGNOSTIC STUDIES:  LABS  none    RADIOLOGY  Final interpretation by radiology demonstrates:    DX-HAND 3+ RIGHT   Final Result      Well-defined erosion at the base of the first proximal phalanx suspicious for gout no other erosive arthropathy can also have this appearance      DX-FOREARM RIGHT   Final Result      No radiographic evidence of acute traumatic injury or bony erosion.      DX-WRIST-LIMITED 2- RIGHT   Final Result      No radiographic evidence of acute traumatic injury or bony erosion.        The radiologist's interpretation of all radiological studies have been reviewed by me.      COURSE & MEDICAL DECISION MAKING    ED Observation Status? No; Patient does not meet criteria for ED Observation.     INITIAL ASSESSMENT AND PLAN    Patient is a 52-year-old female who comes in for evaluation of right hand and wrist pain.  Differential diagnosis includes overuse injury, tendinitis, bony abnormality.  Clinically no concern for infection there is no overlying erythema or warmth.  Will obtain x-rays for further evaluation, blood analysis not indicated.       ER COURSE AND FINAL DISPOSITION   Patient's initial vitals are within normal limits, he is neurovascularly intact on exam.  Patient is treated with Toradol for his discomfort.  He is also placed in a wrist splint.  Imaging returns and demonstrates no acute bony pathology, patient does  have erosion at the base of the first proximal phalanx suspicious for gout.  This may be a flareup of gout likely secondary to overuse at work.  At this time patient has swelling and pain and therefore will be started on naproxen and is advised to rest the area until his swelling and pain have resolved.  He will need clearance back to work and follow-up for this.  Patient is amenable to this plan.  He is provided with prescription for naproxen and given strict return precautions.  He is then discharged in stable condition.    ADDITIONAL PROBLEM LIST AND RESOURCE UTILIZATION    Additional problems aside from the chief complaint that I have addressed: None    I have discussed management of the patient with the following physicians and MARYLOU's: None    Discussion of management with other \[facility] [place] [facility]\"" or appropriate source(s): None    Escalation of care considered, and ultimately not performed: blood analysis.     Barriers to care at this time, including but not limited to: See above.     Decision tools and prescription drugs considered including, but not limited to: None.    Please see review of records as noted above      FINAL IMPRESSION  1. Sprain of right wrist, initial encounter    2. Acute gout of right hand, unspecified cause

## 2023-06-22 NOTE — LETTER
FORM C-4:  EMPLOYEE’S CLAIM FOR COMPENSATION/ REPORT OF INITIAL TREATMENT  EMPLOYEE’S CLAIM - PROVIDE ALL INFORMATION REQUESTED   First Name Vishal Last Name Francois Birthdate 1970  Sex male Claim Number   Home Address [address]   [place]             [place] [address]                                   Age  52 y.o. 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 Be specific and answer in detail. Use additional sheet if necessary)  Pulling data cable   If you believe that you have an occupational disease, when did you first have knowledge of the disability and it relationship to your employment? n/a Witnesses to the Accident  N/A   Nature of Injury or Occupational Disease  Workers' Compensation Part(s) of Body Injured or Affected  Wrist (R) and Hand (R), N/A, N/A    I CERTIFY THAT THE ABOVE IS TRUE AND CORRECT TO THE BEST OF MY KNOWLEDGE AND THAT I HAVE PROVIDED THIS INFORMATION IN ORDER TO OBTAIN THE BENEFITS OF [place] CHAPTER [place]).  I HEREBY AUTHORIZE ANY PHYSICIAN, CHIROPRACTOR, SURGEON, PRACTITIONER, OR  OTHER PERSON, ANY HOSPITAL, INCLUDING [facility] OR [facility], ANY MEDICAL SERVICE ORGANIZATION, ANY INSURANCE COMPANY, OR OTHER INSTITUTION OR ORGANIZATION TO RELEASE TO EACH OTHER, ANY MEDICAL OR OTHER INFORMATION, INCLUDING BENEFITS PAID OR PAYABLE, PERTINENT TO THIS INJURY OR DISEASE, EXCEPT INFORMATION RELATIVE TO DIAGNOSIS, TREATMENT AND/OR COUNSELING FOR AIDS, PSYCHOLOGICAL CONDITIONS, ALCOHOL OR CONTROLLED SUBSTANCES, FOR WHICH I MUST GIVE SPECIFIC AUTHORIZATION.  A PHOTOSTAT OF THIS AUTHORIZATION SHALL BE AS VALID AS THE ORIGINAL.  Date  06-                 [facility]                                         Employee’s Signature   THIS REPORT MUST BE COMPLETED AND MAILED WITHIN 3 WORKING DAYS OF TREATMENT   Place [facility], EMERGENCY DEPT                       Name of Facility [facility]   Date  6/22/2023 Diagnosis  (S63.501A) Sprain of right wrist, initial encounter  (M10.9) Acute gout of right hand, unspecified cause Is there evidence the injured employee was under the influence of alcohol and/or another controlled substance at the time of accident?   Hour  10:16 AM Description of Injury or Disease  Sprain of right wrist, initial encounter  Acute gout of right hand, unspecified cause No   Treatment  X-rays and physical exam  Have you advised the patient to remain off work five days or more?         Yes   X-Ray Findings    Comments:X-ray positive for bony abnormalities consistent with underlying gout If Yes   From Date    To Date      From information given by the employee, together with medical evidence, can you directly connect this injury or occupational disease as job incurred? Yes  Comments:Overuse likely exacerbated underlying condition If No, is employee capable of: Full Duty  No Modified Duty  No   Is additional medical care by a physician indicated? Yes  Comments:Patient will need clearance to return to work If Modified Duty,  "Specify any Limitations / Restrictions   Patient will need follow-up with occupational health to get cleared back to work   Do you know of any previous injury or disease contributing to this condition or occupational disease? Yes  Comments:Underlying gout    Date 6/22/2023 Print Doctor’s Name Yadi Felton certify the employer’s copy of this form was mailed on:   Address [address] [place] [address]  [place] [id number] [phone] INSURER’S USE ONLY   Provider’s Tax ID Number   Telephone Dept: [phone]    Doctor’s Signature e-SignMUYADI FARRIS M.D. Degree  MD      Form C-4 (rev.10/07)                                                                         BRIEF DESCRIPTION OF RIGHTS AND BENEFITS  ([place]ursuant to [place]RS 616C.050)    Notice of Injury or Occupational Disease (Incident Report Form C-1): If an injury or occupational disease (OD) arises out of and in the course of employment, you must provide written notice to your employer as soon as practicable, but no later than 7 days after the accident or OD. Your employer shall maintain a sufficient supply of the required forms.    Claim for Compensation (Form C-4): If medical treatment is sought, the form C-4 is available at the place of initial treatment. A completed \"Claim for Compensation\" (Form C-4) must be filed within 90 days after an accident or OD. The treating physician or chiropractor must, within 3 working days after treatment, complete and mail to the employer, the employer's insurer and third-party , the Claim for Compensation.    Medical Treatment: If you require medical treatment for your on-the-job injury or OD, you may be required to select a physician or chiropractor from a list provided by your workers’ compensation insurer, if it has contracted with an Organization for Managed Care (MCO) or Preferred Provider Organization (PPO) or providers of health care. If your employer has not entered into a contract with an MCO or PPO, you may " select a physician or chiropractor from the Panel of Physicians and Chiropractors. Any medical costs related to your industrial injury or OD will be paid by your insurer.    Temporary Total Disability (TTD): If your doctor has certified that you are unable to work for a period of at least 5 consecutive days, or 5 cumulative days in a 20-day period, or places restrictions on you that your employer does not accommodate, you may be entitled to TTD compensation.    Temporary Partial Disability (TPD): If the wage you receive upon reemployment is less than the compensation for TTD to which you are entitled, the insurer may be required to pay you TPD compensation to make up the difference. TPD can only be paid for a maximum of 24 months.    Permanent Partial Disability (PPD): When your medical condition is stable and there is an indication of a PPD as a result of your injury or OD, within 30 days, your insurer must arrange for an evaluation by a rating physician or chiropractor to determine the degree of your PPD. The amount of your PPD award depends on the date of injury, the results of the PPD evaluation, your age and wage.    Permanent Total Disability (PTD): If you are medically certified by a treating physician or chiropractor as permanently and totally disabled and have been granted a PTD status by your insurer, you are entitled to receive monthly benefits not to exceed 66 2/3% of your average monthly wage. The amount of your PTD payments is subject to reduction if you previously received a lump-sum PPD award.    Vocational Rehabilitation Services: You may be eligible for vocational rehabilitation services if you are unable to return to the job due to a permanent physical impairment or permanent restrictions as a result of your injury or occupational disease.    Transportation and Per Gil Reimbursement: You may be eligible for travel expenses and per gil associated with medical treatment.    Reopening: You may be  able to reopen your claim if your condition worsens after claim closure.     Appeal Process: If you disagree with a written determination issued by the insurer or the insurer does not respond to your request, you may appeal to the Department of Administration, , by following the instructions contained in your determination letter. You must appeal the determination within 70 days from the date of the determination letter at [address], [address], [place], [place] [address], or [address], [address], [place], [place] [address]. If you disagree with the  decision, you may appeal to the Department of Administration, . You must file your appeal within 30 days from the date of the  decision letter at [address], [address], [place], [place] [address], or [address], [place], [place] [address]. If you disagree with a decision of an , you may file a petition for judicial review with the District Court. You must do so within 30 days of the Appeal Officer’s decision. You may be represented by an  at your own expense or you may contact the [facility] for possible representation.    [place]  for Injured Workers (NAIW): If you disagree with a  decision, you may request that NAIW represent you without charge at an  Hearing. For information regarding denial of benefits, you may contact the [facility] at: [address], [address], [place] [address], [phone], or [address], [place] [address], [phone]    To File a Complaint with the Division: If you wish to file a complaint with the  of the Division of Industrial Relations (DIR),  please contact the Workers’ Compensation Section, [address], [place], [place] [address], telephone [phone]) [phone], or [address], [place], [place]  [address], telephone [phone].    For assistance with Workers’ Compensation Issues: You may contact the [facility] Office for Consumer Health Assistance, [facility] [facility], [facility] [address], [place] [id number], Toll Free 1-114.834.2588, Web site: http://[facility].[place].gov/Programs/LISSA E-mail: [email or web address].[place].gov  D-2 (rev. 10/20)              __________________________________________________________________                                    _________________            Employee Name / Signature                                                                                                                            Date

## 2023-06-22 NOTE — LETTER
FORM C-4:  EMPLOYEE’S CLAIM FOR COMPENSATION/ REPORT OF INITIAL TREATMENT  EMPLOYEE’S CLAIM - PROVIDE ALL INFORMATION REQUESTED   First Name Vishal Last Name Francois Birthdate 1970  Sex male Claim Number   Home Address [address]   [place]             [place] [address]                                   Age  52 y.o. 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 Be specific and answer in detail. Use additional sheet if necessary)  Pulling data cable   If you believe that you have an occupational disease, when did you first have knowledge of the disability and it relationship to your employment? n/a Witnesses to the Accident  N/A   Nature of Injury or Occupational Disease  Workers' Compensation Part(s) of Body Injured or Affected  Wrist (R) and Hand (R), N/A, N/A    I CERTIFY THAT THE ABOVE IS TRUE AND CORRECT TO THE BEST OF MY KNOWLEDGE AND THAT I HAVE PROVIDED THIS INFORMATION IN ORDER TO OBTAIN THE [place]EF[place], [place] CHAPTER 617 [place]  I HEREBY AUTHORIZE ANY PHYSICIAN, CHIROPRACTOR, SURGEON, PRACTITIONER, OR OTHER PERSON, ANY HOSPITAL, INCLUDING [facility] OR  [facility], ANY MEDICAL SERVICE ORGANIZATION, ANY INSURANCE COMPANY, OR OTHER INSTITUTION OR ORGANIZATION TO RELEASE TO EACH OTHER, ANY MEDICAL OR OTHER INFORMATION, INCLUDING BENEFITS PAID OR PAYABLE, PERTINENT TO THIS INJURY OR DISEASE, EXCEPT INFORMATION RELATIVE TO DIAGNOSIS, TREATMENT AND/OR COUNSELING FOR AIDS, PSYCHOLOGICAL CONDITIONS, ALCOHOL OR CONTROLLED SUBSTANCES, FOR WHICH I MUST GIVE SPECIFIC AUTHORIZATION.  A PHOTOSTAT OF THIS AUTHORIZATION SHALL BE AS VALID AS THE ORIGINAL.  Date                                      Place   [facility]   Employee’s Signature   THIS REPORT MUST BE COMPLETED AND MAILED WITHIN 3 WORKING DAYS OF TREATMENT   Place [facility], EMERGENCY DEPT                       Name of Facility [facility]   Date  6/22/2023 Diagnosis  (S63.501A) Sprain of right wrist, initial encounter  (M10.9) Acute gout of right hand, unspecified cause Is there evidence the injured employee was under the influence of alcohol and/or another controlled substance at the time of accident?   Hour  10:02 AM Description of Injury or Disease  Sprain of right wrist, initial encounter  Acute gout of right hand, unspecified cause No   Treatment  X-rays and physical exam  Have you advised the patient to remain off work five days or more?         Yes   X-Ray Findings    Comments:X-ray positive for bony abnormalities consistent with underlying gout If Yes   From Date    To Date      From information given by the employee, together with medical evidence, can you directly connect this injury or occupational disease as job incurred? Yes  Comments:Overuse likely exacerbated underlying condition If No, is employee capable of: Full Duty  No Modified Duty  No   Is additional medical care by a physician indicated? Yes  Comments:Patient will need clearance to return to work If Modified Duty, Specify any Limitations / Restrictions   Patient will need follow-up with occupational health  "to get cleared back to work   Do you know of any previous injury or disease contributing to this condition or occupational disease? Yes  Comments:Underlying gout    Date 6/22/2023 Print Doctor’s Name Yadi Felton certify the employer’s copy of this form was mailed on:   Address [address] [place] [address]  [place] [id number] [phone] INSURER’S USE ONLY   Provider’s Tax ID Number   Telephone Dept: [phone]    Doctor’s Signature ahsan-SignYADI FELTON M.D. Degree        Form C-4 (rev.10/07)                                                                         BRIEF DESCRIPTION OF RIGHTS AND BENEFITS  ([place]ursuant to [place] 616C.050)    Notice of Injury or Occupational Disease (Incident Report Form C-1): If an injury or occupational disease (OD) arises out of and in the course of employment, you must provide written notice to your employer as soon as practicable, but no later than 7 days after the accident or OD. Your employer shall maintain a sufficient supply of the required forms.    Claim for Compensation (Form C-4): If medical treatment is sought, the form C-4 is available at the place of initial treatment. A completed \"Claim for Compensation\" (Form C-4) must be filed within 90 days after an accident or OD. The treating physician or chiropractor must, within 3 working days after treatment, complete and mail to the employer, the employer's insurer and third-party , the Claim for Compensation.    Medical Treatment: If you require medical treatment for your on-the-job injury or OD, you may be required to select a physician or chiropractor from a list provided by your workers’ compensation insurer, if it has contracted with an Organization for Managed Care (MCO) or Preferred Provider Organization (PPO) or providers of health care. If your employer has not entered into a contract with an MCO or PPO, you may select a physician or chiropractor from the Panel of Physicians and Chiropractors. Any medical costs " related to your industrial injury or OD will be paid by your insurer.    Temporary Total Disability (TTD): If your doctor has certified that you are unable to work for a period of at least 5 consecutive days, or 5 cumulative days in a 20-day period, or places restrictions on you that your employer does not accommodate, you may be entitled to TTD compensation.    Temporary Partial Disability (TPD): If the wage you receive upon reemployment is less than the compensation for TTD to which you are entitled, the insurer may be required to pay you TPD compensation to make up the difference. TPD can only be paid for a maximum of 24 months.    Permanent Partial Disability (PPD): When your medical condition is stable and there is an indication of a PPD as a result of your injury or OD, within 30 days, your insurer must arrange for an evaluation by a rating physician or chiropractor to determine the degree of your PPD. The amount of your PPD award depends on the date of injury, the results of the PPD evaluation, your age and wage.    Permanent Total Disability (PTD): If you are medically certified by a treating physician or chiropractor as permanently and totally disabled and have been granted a PTD status by your insurer, you are entitled to receive monthly benefits not to exceed 66 2/3% of your average monthly wage. The amount of your PTD payments is subject to reduction if you previously received a lump-sum PPD award.    Vocational Rehabilitation Services: You may be eligible for vocational rehabilitation services if you are unable to return to the job due to a permanent physical impairment or permanent restrictions as a result of your injury or occupational disease.    Transportation and Per Gil Reimbursement: You may be eligible for travel expenses and per gil associated with medical treatment.    Reopening: You may be able to reopen your claim if your condition worsens after claim closure.     Appeal Process: If you  disagree with a written determination issued by the insurer or the insurer does not respond to your request, you may appeal to the Department of Administration, , by following the instructions contained in your determination letter. You must appeal the determination within 70 days from the date of the determination letter at [address], [address], [place], [place] [address], or [address], [place], [place] [address]. If you disagree with the  decision, you may appeal to the Department of Administration, . You must file your appeal within 30 days from the date of the  decision letter at [address], [address], [place], [place] [address], or [address], [address], [place], [place] [address]. If you disagree with a decision of an , you may file a petition for judicial review with the District Court. You must do so within 30 days of the Appeal Officer’s decision. You may be represented by an  at your own expense or you may contact the [facility] for possible representation.    [place]  for In[place]ured Workers ([place]): If you disagree with a  decision, you may request that NAIW represent you without charge at an  Hearing. For information regarding denial of benefits, you may contact the [facility] at: [address], [address], [place] [address], [phone], or [address], [address], [place] [address], [phone]    To File a Complaint with the Division: If you wish to file a complaint with the  of the Division of Industrial Relations (DIR),  please contact the Workers’ Compensation Section, [address], [address], [place], [place] [address], telephone [phone] [phone], or [address], [address], [place], [place] [address], telephone [phone].    For assistance with Workers’ Compensation Issues: You may  contact the [facility] Office for Consumer Health Assistance, [facility] [facility], [facility] [address], [place], [place] [address], Toll Free 1-381.425.2715, Web site: http://[facility].[place].gov/Programs/LISSA E-mail: lissa@[facility].[email or web address].gov  D-2 (rev. 10/20)              __________________________________________________________________                                    _________________            Employee Name / Signature                                                                                                                            Date

## 2023-06-22 NOTE — LETTER
FORM C-4:  EMPLOYEE’S CLAIM FOR COMPENSATION/ REPORT OF INITIAL TREATMENT  EMPLOYEE’S CLAIM - PROVIDE ALL INFORMATION REQUESTED   First Name Vishal Last Name Francois Birthdate 1970  Sex male Claim Number   Home Address [address]   [place]             Zip [address]                                   Age  52 y.o. Height    Weight  90.2 kg (198 lb 13.7 oz) N  [phone]   Mailing Address 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 Be specific and answer in detail. Use additional sheet if necessary)  Pulling data cable   If you believe that you have an occupational disease, when did you first have knowledge of the disability and it relationship to your employment? N/A Witnesses to the Accident  N/A   Nature of Injury or Occupational Disease  Workers' Compensation Part(s) of Body Injured or Affected  Wrist (R) and Hand (R), N/A, N/A    I CERTIFY THAT THE ABOVE IS TRUE AND CORRECT TO THE BEST OF MY KNOWLEDGE AND THAT I HAVE PROVIDED THIS INFORMATION IN ORDER TO OBTAIN THE BENEFITS OF [place] CHAPTER 617 OF [place]).  I HEREBY AUTHORIZE ANY PHYSICIAN, CHIROPRACTOR, SURGEON,  PRACTITIONER, OR OTHER PERSON, ANY HOSPITAL, INCLUDING [facility] OR [facility], ANY MEDICAL SERVICE ORGANIZATION, ANY INSURANCE COMPANY, OR OTHER INSTITUTION OR ORGANIZATION TO RELEASE TO EACH OTHER, ANY MEDICAL OR OTHER INFORMATION, INCLUDING BENEFITS PAID OR PAYABLE, PERTINENT TO THIS INJURY OR DISEASE, EXCEPT INFORMATION RELATIVE TO DIAGNOSIS, TREATMENT AND/OR COUNSELING FOR AIDS, PSYCHOLOGICAL CONDITIONS, ALCOHOL OR CONTROLLED SUBSTANCES, FOR WHICH I MUST GIVE SPECIFIC AUTHORIZATION.  A PHOTOSTAT OF THIS AUTHORIZATION SHALL BE AS VALID AS THE ORIGINAL.  Date     06-                                 [facility]                              Employee’s Signature   THIS REPORT MUST BE COMPLETED AND MAILED WITHIN 3 WORKING DAYS OF TREATMENT   [facility], EMERGENCY DEPT                       Name of Facility [facility]   Date  6/22/2023 Diagnosis  (S63.501A) Sprain of right wrist, initial encounter  (M10.9) Acute gout of right hand, unspecified cause Is there evidence the injured employee was under the influence of alcohol and/or another controlled substance at the time of accident?   Hour  10:06 AM Description of Injury or Disease  Sprain of right wrist, initial encounter  Acute gout of right hand, unspecified cause No   Treatment  X-rays and physical exam  Have you advised the patient to remain off work five days or more?         Yes   X-Ray Findings    Comments:X-ray positive for bony abnormalities consistent with underlying gout If Yes   From Date    To Date      From information given by the employee, together with medical evidence, can you directly connect this injury or occupational disease as job incurred? Yes  Comments:Overuse likely exacerbated underlying condition If No, is employee capable of: Full Duty  No Modified Duty  No   Is additional medical care by a physician indicated? Yes  Comments:Patient will need clearance to return to  "work If Modified Duty, Specify any Limitations / Restrictions   Patient will need follow-up with occupational health to get cleared back to work   Do you know of any previous injury or disease contributing to this condition or occupational disease? Yes  Comments:Underlying gout    Date 6/22/2023 Print Doctor’s Name Yadi Felton certify the employer’s copy of this form was mailed on:   Address [address] [place] [id number] [phone] INSURER’S USE ONLY   Provider’s Tax ID Number   Telephone Dept: [phone]    Doctor’s Signature e-SignMUYADI FARRIS M.D. Degree MD       Form C-4 (rev.10/07)                                                                         BRIEF DESCRIPTION OF RIGHTS AND BENEFITS  (Pursuant to [place]RS 616C.050)    Notice of Injury or Occupational Disease (Incident Report Form C-1): If an injury or occupational disease (OD) arises out of and in the course of employment, you must provide written notice to your employer as soon as practicable, but no later than 7 days after the accident or OD. Your employer shall maintain a sufficient supply of the required forms.    Claim for Compensation (Form C-4): If medical treatment is sought, the form C-4 is available at the place of initial treatment. A completed \"Claim for Compensation\" (Form C-4) must be filed within 90 days after an accident or OD. The treating physician or chiropractor must, within 3 working days after treatment, complete and mail to the employer, the employer's insurer and third-party , the Claim for Compensation.    Medical Treatment: If you require medical treatment for your on-the-job injury or OD, you may be required to select a physician or chiropractor from a list provided by your workers’ compensation insurer, if it has contracted with an Organization for Managed Care (MCO) or Preferred Provider Organization (PPO) or providers of health care. If your employer has not entered into a contract with an " MCO or PPO, you may select a physician or chiropractor from the Panel of Physicians and Chiropractors. Any medical costs related to your industrial injury or OD will be paid by your insurer.    Temporary Total Disability (TTD): If your doctor has certified that you are unable to work for a period of at least 5 consecutive days, or 5 cumulative days in a 20-day period, or places restrictions on you that your employer does not accommodate, you may be entitled to TTD compensation.    Temporary Partial Disability (TPD): If the wage you receive upon reemployment is less than the compensation for TTD to which you are entitled, the insurer may be required to pay you TPD compensation to make up the difference. TPD can only be paid for a maximum of 24 months.    Permanent Partial Disability (PPD): When your medical condition is stable and there is an indication of a PPD as a result of your injury or OD, within 30 days, your insurer must arrange for an evaluation by a rating physician or chiropractor to determine the degree of your PPD. The amount of your PPD award depends on the date of injury, the results of the PPD evaluation, your age and wage.    Permanent Total Disability (PTD): If you are medically certified by a treating physician or chiropractor as permanently and totally disabled and have been granted a PTD status by your insurer, you are entitled to receive monthly benefits not to exceed 66 2/3% of your average monthly wage. The amount of your PTD payments is subject to reduction if you previously received a lump-sum PPD award.    Vocational Rehabilitation Services: You may be eligible for vocational rehabilitation services if you are unable to return to the job due to a permanent physical impairment or permanent restrictions as a result of your injury or occupational disease.    Transportation and Per Gil Reimbursement: You may be eligible for travel expenses and per gil associated with medical  treatment.    Reopening: You may be able to reopen your claim if your condition worsens after claim closure.     Appeal Process: If you disagree with a written determination issued by the insurer or the insurer does not respond to your request, you may appeal to the Department of Administration, , by following the instructions contained in your determination letter. You must appeal the determination within 70 days from the date of the determination letter at [address], [address], [place], [place] [address], or [address], [place], [place] [address]. If you disagree with the  decision, you may appeal to the Department of Administration, . You must file your appeal within 30 days from the date of the  decision letter at [address], [address], [place], [place] [address], or [address], [facility] [address] [facility] [address], [place], [place] [address]. If you disagree with a decision of an , you may file a petition for judicial review with the District Court. You must do so within 30 days of the Appeal Officer’s decision. You may be represented by an  at your own expense or you may contact the [facility] for possible representation.    [place]  for Injured Workers (NAIW): If you disagree with a  decision, you may request that NAIW represent you without charge at an  Hearing. For information regarding denial of benefits, you may contact the [facility] at: [address], [address], [place] [address], [phone], or [address], [address], [place] [address], [phone]    To File a Complaint with the Division: If you wish to file a complaint with the  of the Division of Industrial Relations (DIR),  please contact the Workers’ Compensation Section, [address], [address], [place], [place] [address], telephone [phone], or [address], [address], [place], [place] [address], telephone [phone].    For assistance with Workers’ Compensation Issues: You may contact the [facility] Office for Consumer Health Assistance, [facility], [address], [place], [place] [address], Toll Free 1-552.309.3526, Web site: http://[facility].nv.gov/Programs/LISSA E-mail: lissa@[facility].nv.gov  D-2 (rev. 10/20)              __________________________________________________________________                                    _________________            Employee Name / Signature                                                                                                                            Date

## 2023-06-22 NOTE — LETTER
FORM C-4:  EMPLOYEE’S CLAIM FOR COMPENSATION/ REPORT OF INITIAL TREATMENT  EMPLOYEE’S CLAIM - PROVIDE ALL INFORMATION REQUESTED   First Name iVshal Last Name Francois Birthdate 1970  Sex male Claim Number   Home Address [address]   [place]             [place] [address]                                   Age  52 y.o. Height    Weight  90.2 kg (198 lb 13.7 oz) [employer] [facility]  [id number]   Mailing Address [address]  [place]              [place] [address] Telephone  [phone] (home)  Primary Language Spoken   Insurer  *** Third Party   [employer] Employee's Occupation (Job Title) When Injury or Occupational Disease Occurred     Employer's Name  Telephone     Employer Address  City  State  Zip    Date of Injury  6/21/2023       Hour of Injury  3:00 PM Date Employer Notified  6/21/2023 Last Day of Work after Injury or Occupational Disease  6/21/2023 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 Be specific and answer in detail. Use additional sheet if necessary)  Pulling data cable   If you believe that you have an occupational disease, when did you first have knowledge of the disability and it relationship to your employment? Pulling data cable Witnesses to the Accident  N/A   Nature of Injury or Occupational Disease  Workers' Compensation Part(s) of Body Injured or Affected  Wrist (R) and Hand (R), N/A, N/A    I CERTIFY THAT THE ABOVE IS TRUE AND CORRECT TO THE BEST OF MY KNOWLEDGE AND THAT I HAVE PROVIDED THIS INFORMATION IN ORDER TO OBTAIN THE B[place]EF[place] 616[place], [place]IVE [place] CHAPTER 617 OF [place]).  I HEREBY AUTHORIZE ANY PHYSICIAN, CHIROPRACTOR, SURGEON, PRACTITIONER, OR OTHER PERSON, ANY HOSPITAL, INCLUDING  [facility] OR [facility], ANY MEDICAL SERVICE ORGANIZATION, ANY INSURANCE COMPANY, OR OTHER INSTITUTION OR ORGANIZATION TO RELEASE TO EACH OTHER, ANY MEDICAL OR OTHER INFORMATION, INCLUDING BENEFITS PAID OR PAYABLE, PERTINENT TO THIS INJURY OR DISEASE, EXCEPT INFORMATION RELATIVE TO DIAGNOSIS, TREATMENT AND/OR COUNSELING FOR AIDS, PSYCHOLOGICAL CONDITIONS, ALCOHOL OR CONTROLLED SUBSTANCES, FOR WHICH I MUST GIVE SPECIFIC AUTHORIZATION.  A PHOTOSTAT OF THIS AUTHORIZATION SHALL BE AS VALID AS THE ORIGINAL.  Date                                      Place                                                                             Employee’s Signature   THIS REPORT MUST BE COMPLETED AND MAILED WITHIN 3 WORKING DAYS OF TREATMENT   Place [facility], EMERGENCY DEPT                       Name of Facility [facility]   Date  6/22/2023 Diagnosis  (S63.501A) Sprain of right wrist, initial encounter  (M10.9) Acute gout of right hand, unspecified cause Is there evidence the injured employee was under the influence of alcohol and/or another controlled substance at the time of accident?   Hour  10:00 AM Description of Injury or Disease  Sprain of right wrist, initial encounter  Acute gout of right hand, unspecified cause No   Treatment  X-rays and physical exam  Have you advised the patient to remain off work five days or more?         Yes   X-Ray Findings    Comments:X-ray positive for bony abnormalities consistent with underlying gout If Yes   From Date    To Date      From information given by the employee, together with medical evidence, can you directly connect this injury or occupational disease as job incurred? Yes  Comments:Overuse likely exacerbated underlying condition If No, is employee capable of: Full Duty  No Modified Duty  No   Is additional medical care by a physician indicated? Yes  Comments:Patient will need clearance to return to work If Modified Duty,  "Specify any Limitations / Restrictions   Patient will need follow-up with occupational health to get cleared back to work   Do you know of any previous injury or disease contributing to this condition or occupational disease? Yes  Comments:Underlying gout    Date 6/22/2023 Print Doctor’s Name Yadi Felton certify the employer’s copy of this form was mailed on:   Address [address] [place] [address]  [place] [id number] [phone] INSURER’S USE ONLY   Provider’s Tax ID Number   Telephone Dept: [phone]    Doctor’s Signature e-SignYADI FELTON M.D. Degree        Form C-4 (rev.10/07)                                                                         BRIEF DESCRIPTION OF RIGHTS AND BENEFITS  ([place] to [place] 616C.050)    Notice of Injury or Occupational Disease (Incident Report Form C-1): If an injury or occupational disease (OD) arises out of and in the course of employment, you must provide written notice to your employer as soon as practicable, but no later than 7 days after the accident or OD. Your employer shall maintain a sufficient supply of the required forms.    Claim for Compensation (Form C-4): If medical treatment is sought, the form C-4 is available at the place of initial treatment. A completed \"Claim for Compensation\" (Form C-4) must be filed within 90 days after an accident or OD. The treating physician or chiropractor must, within 3 working days after treatment, complete and mail to the employer, the employer's insurer and third-party , the Claim for Compensation.    Medical Treatment: If you require medical treatment for your on-the-job injury or OD, you may be required to select a physician or chiropractor from a list provided by your workers’ compensation insurer, if it has contracted with an Organization for Managed Care (MCO) or Preferred Provider Organization (PPO) or providers of health care. If your employer has not entered into a contract with an MCO or PPO, you may select " a physician or chiropractor from the Panel of Physicians and Chiropractors. Any medical costs related to your industrial injury or OD will be paid by your insurer.    Temporary Total Disability (TTD): If your doctor has certified that you are unable to work for a period of at least 5 consecutive days, or 5 cumulative days in a 20-day period, or places restrictions on you that your employer does not accommodate, you may be entitled to TTD compensation.    Temporary Partial Disability (TPD): If the wage you receive upon reemployment is less than the compensation for TTD to which you are entitled, the insurer may be required to pay you TPD compensation to make up the difference. TPD can only be paid for a maximum of 24 months.    Permanent Partial Disability (PPD): When your medical condition is stable and there is an indication of a PPD as a result of your injury or OD, within 30 days, your insurer must arrange for an evaluation by a rating physician or chiropractor to determine the degree of your PPD. The amount of your PPD award depends on the date of injury, the results of the PPD evaluation, your age and wage.    Permanent Total Disability (PTD): If you are medically certified by a treating physician or chiropractor as permanently and totally disabled and have been granted a PTD status by your insurer, you are entitled to receive monthly benefits not to exceed 66 2/3% of your average monthly wage. The amount of your PTD payments is subject to reduction if you previously received a lump-sum PPD award.    Vocational Rehabilitation Services: You may be eligible for vocational rehabilitation services if you are unable to return to the job due to a permanent physical impairment or permanent restrictions as a result of your injury or occupational disease.    Transportation and Per Gil Reimbursement: You may be eligible for travel expenses and per gil associated with medical treatment.    Reopening: You may be able to  reopen your claim if your condition worsens after claim closure.     Appeal Process: If you disagree with a written determination issued by the insurer or the insurer does not respond to your request, you may appeal to the Department of Administration, , by following the instructions contained in your determination letter. You must appeal the determination within 70 days from the date of the determination letter at [address], [address], [place], [place] [address], or [address], [address], [place], [place] [address]. If you disagree with the  decision, you may appeal to the Department of Administration, . You must file your appeal within 30 days from the date of the  decision letter at [address], [address], [place], [place] [address], or [address] [facility] [address], [place], [place] [address]. If you disagree with a decision of an , you may file a petition for judicial review with the District Court. You must do so within 30 days of the Appeal Officer’s decision. You may be represented by an  at your own expense or you may contact the [facility] for possible representation.    [place]  for [place] ([place]): If you disagree with a  decision, you may request that NAIW represent you without charge at an  Hearing. For information regarding denial of benefits, you may contact the [facility] at: [address], [address], [place] [address], [phone], or [address], [place] [address], [phone]    To File a Complaint with the Division: If you wish to file a complaint with the  of the Division of Industrial Relations (DIR),  please contact the Workers’ Compensation Section, [address], [facility] [address], [place], [place] [address], telephone [phone], or [address], [facility] [address], [place], [place] [address],  telephone [phone].    For assistance with Workers’ Compensation Issues: You may contact the [facility] Office for Consumer Health Assistance, [facility] [facility], [facility] [address], [place] [address], Toll Free 1-553.454.1950, Web site: http://[facility].[place].gov/Programs/LISSA E-mail: [email or web address].[place].gov  D-2 (rev. 10/20)              __________________________________________________________________                                    _________________            Employee Name / Signature                                                                                                                            Date

## 2023-06-22 NOTE — ED TRIAGE NOTES
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 Last dose 400mg IBU approx 1 hour ago.  /80   Pulse 86   Temp 37.5 °C (99.5 °F) (Temporal)   Resp 20   Wt 90.2 kg (198 lb 13.7 oz)   SpO2 99%   Pt informed of wait times. Educated on triage process.  Asked to return to triage RN for any new or worsening of symptoms. Thanked for patience.      "

## 2023-06-22 NOTE — ED NOTES
Patient roomed from [facility] to [place] [address].  Patient reports that his right hand started to swell and have pain yesterday, worsening through out the night. Patient reports that he uses same repetitive movement with his hands at work, denies any specific trauma. CMS intact, swelling noted.     Call light and TV remote introduced.  Chart up for ERP.

## 2023-06-22 NOTE — ED NOTES
Vishal Parrish has been discharged from the Emergency Room.    Discharge instructions, which include signs and symptoms to monitor patient for, as well as detailed information regarding wrist sprain and gout provided.  All questions and concerns addressed at this time.      Prescription for Naproxen sent to preferred pharmacy.    Patient leaves ER in no apparent distress. This RN provided education regarding returning to the ER for any new concerns or changes in patient's condition.      BP (!) 140/87   Pulse 62   Temp 37.2 °C (99 °F) (Temporal)   Resp 18   Wt 90.2 kg (198 lb 13.7 oz)   SpO2 99%   BMI 25.53 kg/m²

## 2023-06-23 ENCOUNTER — ANESTHESIA EVENT (OUTPATIENT)
Dept: SURGERY | Facility: MEDICAL CENTER | Age: 53
DRG: 988 | End: 2023-06-23
Payer: COMMERCIAL

## 2023-06-23 ENCOUNTER — ANESTHESIA (OUTPATIENT)
Dept: SURGERY | Facility: MEDICAL CENTER | Age: 53
DRG: 988 | End: 2023-06-23
Payer: COMMERCIAL

## 2023-06-23 PROBLEM — L03.119 CELLULITIS AND ABSCESS OF HAND: Status: ACTIVE | Noted: 2023-06-22

## 2023-06-23 PROBLEM — R73.9 HYPERGLYCEMIA: Status: ACTIVE | Noted: 2023-06-23

## 2023-06-23 PROBLEM — R03.0 ELEVATED BP WITHOUT DIAGNOSIS OF HYPERTENSION: Status: ACTIVE | Noted: 2023-06-23

## 2023-06-23 PROBLEM — E87.6 HYPOKALEMIA: Status: ACTIVE | Noted: 2023-06-23

## 2023-06-23 PROBLEM — I10 HYPERTENSION: Status: ACTIVE | Noted: 2023-06-23

## 2023-06-23 LAB
ALBUMIN SERPL BCP-MCNC: 3.9 G/DL (ref 3.2–4.9)
ALBUMIN/GLOB SERPL: 1.4 G/DL
ALP SERPL-CCNC: 80 U/L (ref 30–99)
ALT SERPL-CCNC: 42 U/L (ref 2–50)
ANION GAP SERPL CALC-SCNC: 15 MMOL/L (ref 7–16)
AST SERPL-CCNC: 37 U/L (ref 12–45)
BASOPHILS # BLD AUTO: 0.4 % (ref 0–1.8)
BASOPHILS # BLD: 0.1 K/UL (ref 0–0.12)
BILIRUB SERPL-MCNC: 1.1 MG/DL (ref 0.1–1.5)
BUN SERPL-MCNC: 10 MG/DL (ref 8–22)
CALCIUM ALBUM COR SERPL-MCNC: 9.4 MG/DL (ref 8.5–10.5)
CALCIUM SERPL-MCNC: 9.3 MG/DL (ref 8.5–10.5)
CHLORIDE SERPL-SCNC: 96 MMOL/L (ref 96–112)
CK SERPL-CCNC: 224 U/L (ref 0–154)
CO2 SERPL-SCNC: 23 MMOL/L (ref 20–33)
CREAT SERPL-MCNC: 1.04 MG/DL (ref 0.5–1.4)
EKG IMPRESSION: NORMAL
EOSINOPHIL # BLD AUTO: 0.01 K/UL (ref 0–0.51)
EOSINOPHIL NFR BLD: 0 % (ref 0–6.9)
ERYTHROCYTE [DISTWIDTH] IN BLOOD BY AUTOMATED COUNT: 42.5 FL (ref 35.9–50)
EST. AVERAGE GLUCOSE BLD GHB EST-MCNC: 114 MG/DL
GFR SERPLBLD CREATININE-BSD FMLA CKD-EPI: 86 ML/MIN/1.73 M 2
GLOBULIN SER CALC-MCNC: 2.7 G/DL (ref 1.9–3.5)
GLUCOSE SERPL-MCNC: 127 MG/DL (ref 65–99)
GRAM STN SPEC: NORMAL
HBA1C MFR BLD: 5.6 % (ref 4–5.6)
HCT VFR BLD AUTO: 39 % (ref 42–52)
HGB BLD-MCNC: 13.3 G/DL (ref 14–18)
IMM GRANULOCYTES # BLD AUTO: 0.5 K/UL (ref 0–0.11)
IMM GRANULOCYTES NFR BLD AUTO: 1.9 % (ref 0–0.9)
LYMPHOCYTES # BLD AUTO: 1.28 K/UL (ref 1–4.8)
LYMPHOCYTES NFR BLD: 4.8 % (ref 22–41)
MAGNESIUM SERPL-MCNC: 1.6 MG/DL (ref 1.5–2.5)
MCH RBC QN AUTO: 30.5 PG (ref 27–33)
MCHC RBC AUTO-ENTMCNC: 34.1 G/DL (ref 32.3–36.5)
MCV RBC AUTO: 89.4 FL (ref 81.4–97.8)
MONOCYTES # BLD AUTO: 1.71 K/UL (ref 0–0.85)
MONOCYTES NFR BLD AUTO: 6.4 % (ref 0–13.4)
NEUTROPHILS # BLD AUTO: 23.31 K/UL (ref 1.82–7.42)
NEUTROPHILS NFR BLD: 86.5 % (ref 44–72)
NRBC # BLD AUTO: 0 K/UL
NRBC BLD-RTO: 0 /100 WBC (ref 0–0.2)
PHOSPHATE SERPL-MCNC: 1.7 MG/DL (ref 2.5–4.5)
PLATELET # BLD AUTO: 191 K/UL (ref 164–446)
PMV BLD AUTO: 10.7 FL (ref 9–12.9)
POTASSIUM SERPL-SCNC: 3.8 MMOL/L (ref 3.6–5.5)
PROT SERPL-MCNC: 6.6 G/DL (ref 6–8.2)
RBC # BLD AUTO: 4.36 M/UL (ref 4.7–6.1)
SIGNIFICANT IND 70042: NORMAL
SITE SITE: NORMAL
SODIUM SERPL-SCNC: 134 MMOL/L (ref 135–145)
SOURCE SOURCE: NORMAL
TSH SERPL DL<=0.005 MIU/L-ACNC: 0.31 UIU/ML (ref 0.38–5.33)
WBC # BLD AUTO: 26.9 K/UL (ref 4.8–10.8)

## 2023-06-23 PROCEDURE — 700105 HCHG RX REV CODE 258: Performed by: INTERNAL MEDICINE

## 2023-06-23 PROCEDURE — 93005 ELECTROCARDIOGRAM TRACING: CPT | Performed by: STUDENT IN AN ORGANIZED HEALTH CARE EDUCATION/TRAINING PROGRAM

## 2023-06-23 PROCEDURE — 10061 I&D ABSCESS COMP/MULTIPLE: CPT | Mod: RT | Performed by: STUDENT IN AN ORGANIZED HEALTH CARE EDUCATION/TRAINING PROGRAM

## 2023-06-23 PROCEDURE — 84443 ASSAY THYROID STIM HORMONE: CPT

## 2023-06-23 PROCEDURE — 84100 ASSAY OF PHOSPHORUS: CPT

## 2023-06-23 PROCEDURE — 83735 ASSAY OF MAGNESIUM: CPT

## 2023-06-23 PROCEDURE — 87075 CULTR BACTERIA EXCEPT BLOOD: CPT

## 2023-06-23 PROCEDURE — 87070 CULTURE OTHR SPECIMN AEROBIC: CPT

## 2023-06-23 PROCEDURE — 93010 ELECTROCARDIOGRAM REPORT: CPT | Performed by: INTERNAL MEDICINE

## 2023-06-23 PROCEDURE — 87205 SMEAR GRAM STAIN: CPT

## 2023-06-23 PROCEDURE — 160048 HCHG OR STATISTICAL LEVEL 1-5: Performed by: STUDENT IN AN ORGANIZED HEALTH CARE EDUCATION/TRAINING PROGRAM

## 2023-06-23 PROCEDURE — 160002 HCHG RECOVERY MINUTES (STAT): Performed by: STUDENT IN AN ORGANIZED HEALTH CARE EDUCATION/TRAINING PROGRAM

## 2023-06-23 PROCEDURE — 700102 HCHG RX REV CODE 250 W/ 637 OVERRIDE(OP): Performed by: INTERNAL MEDICINE

## 2023-06-23 PROCEDURE — 82550 ASSAY OF CK (CPK): CPT

## 2023-06-23 PROCEDURE — 99232 SBSQ HOSP IP/OBS MODERATE 35: CPT | Performed by: FAMILY MEDICINE

## 2023-06-23 PROCEDURE — 700111 HCHG RX REV CODE 636 W/ 250 OVERRIDE (IP): Performed by: ANESTHESIOLOGY

## 2023-06-23 PROCEDURE — 770001 HCHG ROOM/CARE - MED/SURG/GYN PRIV*

## 2023-06-23 PROCEDURE — 700101 HCHG RX REV CODE 250: Performed by: ANESTHESIOLOGY

## 2023-06-23 PROCEDURE — 01N50ZZ RELEASE MEDIAN NERVE, OPEN APPROACH: ICD-10-PCS | Performed by: STUDENT IN AN ORGANIZED HEALTH CARE EDUCATION/TRAINING PROGRAM

## 2023-06-23 PROCEDURE — 700105 HCHG RX REV CODE 258: Performed by: ANESTHESIOLOGY

## 2023-06-23 PROCEDURE — 700111 HCHG RX REV CODE 636 W/ 250 OVERRIDE (IP): Performed by: INTERNAL MEDICINE

## 2023-06-23 PROCEDURE — 85025 COMPLETE CBC W/AUTO DIFF WBC: CPT

## 2023-06-23 PROCEDURE — 160039 HCHG SURGERY MINUTES - EA ADDL 1 MIN LEVEL 3: Performed by: STUDENT IN AN ORGANIZED HEALTH CARE EDUCATION/TRAINING PROGRAM

## 2023-06-23 PROCEDURE — 160035 HCHG PACU - 1ST 60 MINS PHASE I: Performed by: STUDENT IN AN ORGANIZED HEALTH CARE EDUCATION/TRAINING PROGRAM

## 2023-06-23 PROCEDURE — 64721 CARPAL TUNNEL SURGERY: CPT | Mod: RT | Performed by: STUDENT IN AN ORGANIZED HEALTH CARE EDUCATION/TRAINING PROGRAM

## 2023-06-23 PROCEDURE — 160009 HCHG ANES TIME/MIN: Performed by: STUDENT IN AN ORGANIZED HEALTH CARE EDUCATION/TRAINING PROGRAM

## 2023-06-23 PROCEDURE — 36415 COLL VENOUS BLD VENIPUNCTURE: CPT

## 2023-06-23 PROCEDURE — 83036 HEMOGLOBIN GLYCOSYLATED A1C: CPT

## 2023-06-23 PROCEDURE — 80053 COMPREHEN METABOLIC PANEL: CPT

## 2023-06-23 PROCEDURE — 87077 CULTURE AEROBIC IDENTIFY: CPT | Mod: 91

## 2023-06-23 PROCEDURE — 0X9J00Z DRAINAGE OF RIGHT HAND WITH DRAINAGE DEVICE, OPEN APPROACH: ICD-10-PCS | Performed by: STUDENT IN AN ORGANIZED HEALTH CARE EDUCATION/TRAINING PROGRAM

## 2023-06-23 PROCEDURE — A9270 NON-COVERED ITEM OR SERVICE: HCPCS | Performed by: INTERNAL MEDICINE

## 2023-06-23 PROCEDURE — 160036 HCHG PACU - EA ADDL 30 MINS PHASE I: Performed by: STUDENT IN AN ORGANIZED HEALTH CARE EDUCATION/TRAINING PROGRAM

## 2023-06-23 PROCEDURE — 160028 HCHG SURGERY MINUTES - 1ST 30 MINS LEVEL 3: Performed by: STUDENT IN AN ORGANIZED HEALTH CARE EDUCATION/TRAINING PROGRAM

## 2023-06-23 PROCEDURE — 01810 ANES PX NRV MUSC F/ARM WRST: CPT | Performed by: ANESTHESIOLOGY

## 2023-06-23 RX ORDER — MEPERIDINE HYDROCHLORIDE 25 MG/ML
12.5 INJECTION INTRAMUSCULAR; INTRAVENOUS; SUBCUTANEOUS
Status: DISCONTINUED | OUTPATIENT
Start: 2023-06-23 | End: 2023-06-23 | Stop reason: HOSPADM

## 2023-06-23 RX ORDER — DEXAMETHASONE SODIUM PHOSPHATE 4 MG/ML
INJECTION, SOLUTION INTRA-ARTICULAR; INTRALESIONAL; INTRAMUSCULAR; INTRAVENOUS; SOFT TISSUE PRN
Status: DISCONTINUED | OUTPATIENT
Start: 2023-06-23 | End: 2023-06-23 | Stop reason: SURG

## 2023-06-23 RX ORDER — HYDROMORPHONE HYDROCHLORIDE 1 MG/ML
0.1 INJECTION, SOLUTION INTRAMUSCULAR; INTRAVENOUS; SUBCUTANEOUS
Status: DISCONTINUED | OUTPATIENT
Start: 2023-06-23 | End: 2023-06-23 | Stop reason: HOSPADM

## 2023-06-23 RX ORDER — DIPHENHYDRAMINE HYDROCHLORIDE 50 MG/ML
12.5 INJECTION INTRAMUSCULAR; INTRAVENOUS
Status: DISCONTINUED | OUTPATIENT
Start: 2023-06-23 | End: 2023-06-23 | Stop reason: HOSPADM

## 2023-06-23 RX ORDER — LIDOCAINE HYDROCHLORIDE 20 MG/ML
INJECTION, SOLUTION EPIDURAL; INFILTRATION; INTRACAUDAL; PERINEURAL PRN
Status: DISCONTINUED | OUTPATIENT
Start: 2023-06-23 | End: 2023-06-23 | Stop reason: SURG

## 2023-06-23 RX ORDER — KETOROLAC TROMETHAMINE 30 MG/ML
INJECTION, SOLUTION INTRAMUSCULAR; INTRAVENOUS PRN
Status: DISCONTINUED | OUTPATIENT
Start: 2023-06-23 | End: 2023-06-23 | Stop reason: SURG

## 2023-06-23 RX ORDER — HALOPERIDOL 5 MG/ML
1 INJECTION INTRAMUSCULAR
Status: DISCONTINUED | OUTPATIENT
Start: 2023-06-23 | End: 2023-06-23 | Stop reason: HOSPADM

## 2023-06-23 RX ORDER — SODIUM CHLORIDE, SODIUM LACTATE, POTASSIUM CHLORIDE, CALCIUM CHLORIDE 600; 310; 30; 20 MG/100ML; MG/100ML; MG/100ML; MG/100ML
INJECTION, SOLUTION INTRAVENOUS CONTINUOUS
Status: DISCONTINUED | OUTPATIENT
Start: 2023-06-23 | End: 2023-06-23 | Stop reason: HOSPADM

## 2023-06-23 RX ORDER — OXYCODONE HCL 5 MG/5 ML
10 SOLUTION, ORAL ORAL
Status: DISCONTINUED | OUTPATIENT
Start: 2023-06-23 | End: 2023-06-23 | Stop reason: HOSPADM

## 2023-06-23 RX ORDER — HYDROMORPHONE HYDROCHLORIDE 1 MG/ML
0.2 INJECTION, SOLUTION INTRAMUSCULAR; INTRAVENOUS; SUBCUTANEOUS
Status: DISCONTINUED | OUTPATIENT
Start: 2023-06-23 | End: 2023-06-23 | Stop reason: HOSPADM

## 2023-06-23 RX ORDER — HYDROMORPHONE HYDROCHLORIDE 1 MG/ML
0.4 INJECTION, SOLUTION INTRAMUSCULAR; INTRAVENOUS; SUBCUTANEOUS
Status: DISCONTINUED | OUTPATIENT
Start: 2023-06-23 | End: 2023-06-23 | Stop reason: HOSPADM

## 2023-06-23 RX ORDER — MIDAZOLAM HYDROCHLORIDE 1 MG/ML
1 INJECTION INTRAMUSCULAR; INTRAVENOUS
Status: DISCONTINUED | OUTPATIENT
Start: 2023-06-23 | End: 2023-06-23 | Stop reason: HOSPADM

## 2023-06-23 RX ORDER — OXYCODONE HCL 5 MG/5 ML
5 SOLUTION, ORAL ORAL
Status: DISCONTINUED | OUTPATIENT
Start: 2023-06-23 | End: 2023-06-23 | Stop reason: HOSPADM

## 2023-06-23 RX ORDER — POTASSIUM CHLORIDE 20 MEQ/1
40 TABLET, EXTENDED RELEASE ORAL ONCE
Status: DISCONTINUED | OUTPATIENT
Start: 2023-06-23 | End: 2023-06-23

## 2023-06-23 RX ORDER — SODIUM CHLORIDE, SODIUM LACTATE, POTASSIUM CHLORIDE, CALCIUM CHLORIDE 600; 310; 30; 20 MG/100ML; MG/100ML; MG/100ML; MG/100ML
INJECTION, SOLUTION INTRAVENOUS
Status: DISCONTINUED | OUTPATIENT
Start: 2023-06-23 | End: 2023-06-23 | Stop reason: SURG

## 2023-06-23 RX ORDER — ONDANSETRON 2 MG/ML
INJECTION INTRAMUSCULAR; INTRAVENOUS PRN
Status: DISCONTINUED | OUTPATIENT
Start: 2023-06-23 | End: 2023-06-23 | Stop reason: SURG

## 2023-06-23 RX ORDER — ONDANSETRON 2 MG/ML
4 INJECTION INTRAMUSCULAR; INTRAVENOUS
Status: DISCONTINUED | OUTPATIENT
Start: 2023-06-23 | End: 2023-06-23 | Stop reason: HOSPADM

## 2023-06-23 RX ADMIN — AMPICILLIN AND SULBACTAM 3 G: 1; 2 INJECTION, POWDER, FOR SOLUTION INTRAMUSCULAR; INTRAVENOUS at 13:45

## 2023-06-23 RX ADMIN — OXYCODONE HYDROCHLORIDE 5 MG: 5 TABLET ORAL at 01:35

## 2023-06-23 RX ADMIN — AMPICILLIN AND SULBACTAM 3 G: 1; 2 INJECTION, POWDER, FOR SOLUTION INTRAMUSCULAR; INTRAVENOUS at 00:29

## 2023-06-23 RX ADMIN — AMPICILLIN AND SULBACTAM 3 G: 1; 2 INJECTION, POWDER, FOR SOLUTION INTRAMUSCULAR; INTRAVENOUS at 06:07

## 2023-06-23 RX ADMIN — AMPICILLIN AND SULBACTAM 3 G: 1; 2 INJECTION, POWDER, FOR SOLUTION INTRAMUSCULAR; INTRAVENOUS at 17:42

## 2023-06-23 RX ADMIN — FENTANYL CITRATE 100 MCG: 50 INJECTION, SOLUTION INTRAMUSCULAR; INTRAVENOUS at 07:33

## 2023-06-23 RX ADMIN — DEXAMETHASONE SODIUM PHOSPHATE 8 MG: 4 INJECTION INTRA-ARTICULAR; INTRALESIONAL; INTRAMUSCULAR; INTRAVENOUS; SOFT TISSUE at 07:52

## 2023-06-23 RX ADMIN — AMPICILLIN AND SULBACTAM 3 G: 1; 2 INJECTION, POWDER, FOR SOLUTION INTRAMUSCULAR; INTRAVENOUS at 23:21

## 2023-06-23 RX ADMIN — ONDANSETRON 4 MG: 2 INJECTION INTRAMUSCULAR; INTRAVENOUS at 08:31

## 2023-06-23 RX ADMIN — KETOROLAC TROMETHAMINE 30 MG: 30 INJECTION, SOLUTION INTRAMUSCULAR; INTRAVENOUS at 08:31

## 2023-06-23 RX ADMIN — LIDOCAINE HYDROCHLORIDE 60 MG: 20 INJECTION, SOLUTION EPIDURAL; INFILTRATION; INTRACAUDAL at 07:36

## 2023-06-23 RX ADMIN — ENOXAPARIN SODIUM 40 MG: 100 INJECTION SUBCUTANEOUS at 17:42

## 2023-06-23 RX ADMIN — SENNOSIDES AND DOCUSATE SODIUM 2 TABLET: 50; 8.6 TABLET ORAL at 17:43

## 2023-06-23 RX ADMIN — VANCOMYCIN HYDROCHLORIDE 1250 MG: 5 INJECTION, POWDER, LYOPHILIZED, FOR SOLUTION INTRAVENOUS at 11:03

## 2023-06-23 RX ADMIN — PROPOFOL 180 MG: 10 INJECTION, EMULSION INTRAVENOUS at 07:36

## 2023-06-23 RX ADMIN — SODIUM CHLORIDE, POTASSIUM CHLORIDE, SODIUM LACTATE AND CALCIUM CHLORIDE: 600; 310; 30; 20 INJECTION, SOLUTION INTRAVENOUS at 07:30

## 2023-06-23 ASSESSMENT — PATIENT HEALTH QUESTIONNAIRE - PHQ9
1. LITTLE INTEREST OR PLEASURE IN DOING THINGS: NOT AT ALL
2. FEELING DOWN, DEPRESSED, IRRITABLE, OR HOPELESS: NOT AT ALL
SUM OF ALL RESPONSES TO PHQ9 QUESTIONS 1 AND 2: 0

## 2023-06-23 ASSESSMENT — LIFESTYLE VARIABLES
HAVE YOU EVER FELT YOU SHOULD CUT DOWN ON YOUR DRINKING: NO
TOTAL SCORE: 0
EVER HAD A DRINK FIRST THING IN THE MORNING TO STEADY YOUR NERVES TO GET RID OF A HANGOVER: NO
CONSUMPTION TOTAL: NEGATIVE
TOTAL SCORE: 0
ALCOHOL_USE: NO
HOW MANY TIMES IN THE PAST YEAR HAVE YOU HAD 5 OR MORE DRINKS IN A DAY: 0
DOES PATIENT WANT TO STOP DRINKING: NO
EVER FELT BAD OR GUILTY ABOUT YOUR DRINKING: NO
AVERAGE NUMBER OF DAYS PER WEEK YOU HAVE A DRINK CONTAINING ALCOHOL: 0
ON A TYPICAL DAY WHEN YOU DRINK ALCOHOL HOW MANY DRINKS DO YOU HAVE: 0
TOTAL SCORE: 0
HAVE PEOPLE ANNOYED YOU BY CRITICIZING YOUR DRINKING: NO
SUBSTANCE_ABUSE: 0

## 2023-06-23 ASSESSMENT — COGNITIVE AND FUNCTIONAL STATUS - GENERAL
SUGGESTED CMS G CODE MODIFIER MOBILITY: CH
DRESSING REGULAR UPPER BODY CLOTHING: A LITTLE
DRESSING REGULAR LOWER BODY CLOTHING: A LITTLE
DAILY ACTIVITIY SCORE: 22
SUGGESTED CMS G CODE MODIFIER DAILY ACTIVITY: CJ
MOBILITY SCORE: 23
MOBILITY SCORE: 24
CLIMB 3 TO 5 STEPS WITH RAILING: A LITTLE
SUGGESTED CMS G CODE MODIFIER MOBILITY: CI
DAILY ACTIVITIY SCORE: 24
SUGGESTED CMS G CODE MODIFIER DAILY ACTIVITY: CH

## 2023-06-23 ASSESSMENT — ENCOUNTER SYMPTOMS
NERVOUS/ANXIOUS: 0
HEARTBURN: 0
COUGH: 0
VOMITING: 0
BACK PAIN: 0
NECK PAIN: 0
SPEECH CHANGE: 0
DIARRHEA: 0
DIAPHORESIS: 0
SENSORY CHANGE: 0
SORE THROAT: 0
WHEEZING: 0
POLYDIPSIA: 0
MYALGIAS: 0
NAUSEA: 0
SHORTNESS OF BREATH: 0
FEVER: 0
ORTHOPNEA: 0
FOCAL WEAKNESS: 0
WEAKNESS: 1
PALPITATIONS: 0
HEADACHES: 0
CHILLS: 0
DOUBLE VISION: 0
WEIGHT LOSS: 0
BLURRED VISION: 0
SPUTUM PRODUCTION: 0
BRUISES/BLEEDS EASILY: 0
HEMOPTYSIS: 0
TREMORS: 0
PHOTOPHOBIA: 0
DIZZINESS: 0
FLANK PAIN: 0
HALLUCINATIONS: 0
ABDOMINAL PAIN: 0

## 2023-06-23 ASSESSMENT — PAIN DESCRIPTION - PAIN TYPE
TYPE: ACUTE PAIN
TYPE: ACUTE PAIN
TYPE: SURGICAL PAIN
TYPE: SURGICAL PAIN
TYPE: ACUTE PAIN;CHRONIC PAIN
TYPE: ACUTE PAIN
TYPE: SURGICAL PAIN

## 2023-06-23 ASSESSMENT — PAIN SCALES - GENERAL: PAIN_LEVEL: 4

## 2023-06-23 NOTE — CARE PLAN
The patient is Stable - Low risk of patient condition declining or worsening    Shift Goals  Clinical Goals: Pain management, VSS, iv abx, wound healing post OP  Patient Goals: rest, and eat.  Family Goals: NA    Progress made toward(s) clinical / shift goals:        Problem: Pain - Standard  Goal: Alleviation of pain or a reduction in pain to the patient’s comfort goal  Outcome: Progressing   PRN medication per MAR, repositioning offered and non pharm interventions with adequate relief.   Problem: Knowledge Deficit - Standard  Goal: Patient and family/care givers will demonstrate understanding of plan of care, disease process/condition, diagnostic tests and medications  Outcome: Progressing   VSS, iv abx infusing. Dressing c/d/I with 2 penrose drains in place.     Patient is not progressing towards the following goals:

## 2023-06-23 NOTE — ED NOTES
Report to DIMITRI Sesay. All questions and concerns addressed at this time. PT to be taken to room by transport

## 2023-06-23 NOTE — H&P
Hospital Medicine History & Physical Note    Date of Service  6/22/2023    Primary Care Physician  Pcp Pt States None    Consultants  Orthopedic surgery Dr. Larson    Code Status  Full Code    Chief Complaint  Chief Complaint   Patient presents with    Hand Swelling     RIGHT  10/10 burning pain  Swelling extend to wrist       History of Presenting Illness  Vishal Parrish is a 52 y.o. male with past medical history of asthma who presented 6/22/2023 with complaints of severe right hand pain worsening with movement, as well as swelling of the right hand and forearm that started last night, throbbing and burning.  Denies any trauma.  Denies chills or fever.  CT of the right upper extremity showed small abscess adjacent to the distal ulna and pisiform bone.  Diffuse subcutaneous edema.  Orthopedic surgery/Dr. Larson consulted.  It was felt that patient does not require immediate surgery, because he does not have compartment syndrome.  Plan to reevaluate in a.m. for possible surgery,.  Keep n.p.o. at midnight.    I discussed the plan of care with patient.    Review of Systems  Review of Systems   Constitutional:  Negative for chills, fever and weight loss.   HENT:  Negative for ear pain, hearing loss and tinnitus.    Eyes:  Negative for blurred vision, double vision and photophobia.   Respiratory:  Negative for cough, hemoptysis and sputum production.    Cardiovascular:  Negative for chest pain, palpitations and orthopnea.   Gastrointestinal:  Negative for heartburn, nausea and vomiting.   Genitourinary:  Negative for dysuria, flank pain, frequency and hematuria.   Musculoskeletal:  Positive for joint pain. Negative for back pain and neck pain.   Skin:  Negative for itching and rash.   Neurological:  Negative for tremors, speech change, focal weakness and headaches.   Endo/Heme/Allergies:  Negative for environmental allergies and polydipsia. Does not bruise/bleed easily.   Psychiatric/Behavioral:  Negative for  hallucinations and substance abuse. The patient is not nervous/anxious.        Past Medical History   has a past medical history of Asthma.    Surgical History   has no past surgical history on file.     Family History  family history includes No Known Problems in his father and mother.   Family history reviewed with patient. There is no family history that is pertinent to the chief complaint.     Social History   reports that he has never smoked. He has never used smokeless tobacco. He reports that he does not drink alcohol and does not use drugs.    Allergies  No Known Allergies    Medications  Prior to Admission Medications   Prescriptions Last Dose Informant Patient Reported? Taking?   naproxen (NAPROSYN) 500 MG Tab   No No   Sig: Take 1 Tablet by mouth 2 times a day with meals.      Facility-Administered Medications: None       Physical Exam  Temp:  [37.2 °C (99 °F)-37.6 °C (99.6 °F)] 37.6 °C (99.6 °F)  Pulse:  [62-86] 70  Resp:  [16-20] 18  BP: (116-152)/(79-96) 152/90  SpO2:  [94 %-99 %] 94 %  Blood Pressure: (!) 152/90   Temperature: 37.6 °C (99.6 °F)   Pulse: 70   Respiration: 18   Pulse Oximetry: 94 %       Physical Exam  Vitals and nursing note reviewed.   Constitutional:       General: He is not in acute distress.     Appearance: Normal appearance.   HENT:      Head: Normocephalic and atraumatic.      Nose: Nose normal.      Mouth/Throat:      Mouth: Mucous membranes are moist.   Eyes:      Extraocular Movements: Extraocular movements intact.      Pupils: Pupils are equal, round, and reactive to light.   Cardiovascular:      Rate and Rhythm: Normal rate and regular rhythm.   Pulmonary:      Effort: Pulmonary effort is normal.      Breath sounds: Normal breath sounds.   Abdominal:      General: Abdomen is flat. There is no distension.      Tenderness: There is no abdominal tenderness.   Musculoskeletal:         General: No swelling or deformity. Normal range of motion.      Right hand: Swelling and  tenderness present. Decreased strength.      Cervical back: Normal range of motion and neck supple.   Skin:     General: Skin is warm and dry.   Neurological:      General: No focal deficit present.      Mental Status: He is alert and oriented to person, place, and time.   Psychiatric:         Mood and Affect: Mood normal.         Behavior: Behavior normal.         Laboratory:  Recent Labs     06/22/23 1847   WBC 20.6*   RBC 4.70   HEMOGLOBIN 14.9   HEMATOCRIT 41.8*   MCV 88.9   MCH 31.7   MCHC 35.6   RDW 41.8   PLATELETCT 231   MPV 10.6     Recent Labs     06/22/23 1847   SODIUM 134*   POTASSIUM 3.5*   CHLORIDE 97   CO2 24   GLUCOSE 122*   BUN 9   CREATININE 1.09   CALCIUM 9.8     Recent Labs     06/22/23 1847   ALTSGPT 34   ASTSGOT 39   ALKPHOSPHAT 85   TBILIRUBIN 1.0   GLUCOSE 122*         No results for input(s): NTPROBNP in the last 72 hours.      No results for input(s): TROPONINT in the last 72 hours.    Imaging:  CT-EXTREMITY, UPPER WITH RIGHT   Final Result      1.  There is a focal enhancing fluid collection adjacent to the distal ulna and pisiform bone which could be a small soft tissue abscess.   2.  There is diffuse subcutaneous and intramuscular edema.   3.  Subchondral cystic changes in the carpal bones with possible small erosion on the ulnar side of the lunate and at the base of the 1st proximal phalanx. Possibility of an erosive arthropathy or possible infectious etiology could be considered.      DX-WRIST-LIMITED 2- RIGHT   Final Result         No acute osseous abnormality.              Assessment/Plan:  Justification for Admission Status  I anticipate this patient will require at least two midnights for appropriate medical management, necessitating inpatient admission because right hand cellulitis and abscess    Patient will need a Med/Surg bed on ORTHOPEDICS service .  The need is secondary to right hand cellulitis and abscess.    * Right hand cellulitis and abscess- (present on  admission)  Assessment & Plan  Concern for possible abscess on CT.  Orthopedic surgery evaluated, plan to reevaluate in a.m. for need for surgery.  Plan to continue Unasyn and vancomycin.  Follow-up with cultures when available.  Pain control    Hypertension  Assessment & Plan  Pressure 152/90  Monitor.  Consider starting scheduled medications    Hyperglycemia  Assessment & Plan  Blood sugar 122.  Denies history of diabetes  We will plan to check A1c.    Hypokalemia  Assessment & Plan  Replacement ordered.  Potassium 3.5.    Asthma- (present on admission)  Assessment & Plan  Not in exacerbation        VTE prophylaxis: enoxaparin ppx

## 2023-06-23 NOTE — CONSULTS
"Orthopedic Consult Note    6/22/2023    Reason for consult: Right hand pain and swelling concern for infection      HPI: Vishal Parrish is a 52 y.o. male with past history of IV drug use who presents with complaints of pain to right hand for 3 days.  States he noticed it all of a sudden at night.  This pain and swelling to his hand continued to get worse as he finally presented to the ER today.  Denies any history of trauma.  Denies any recent IV drug use however he avoids answering questions regarding his drug use.  States he did have some numbness and tingling originally however he states now it is just pain.  Denies numbness or tingling to his thumb index or middle finger.  First he stated that last drug use was a month ago then a year ago then he could not remember.  Past Medical History:   Diagnosis Date    Asthma        History reviewed. No pertinent surgical history.    Medications  No current facility-administered medications on file prior to encounter.     Current Outpatient Medications on File Prior to Encounter   Medication Sig Dispense Refill    naproxen (NAPROSYN) 500 MG Tab Take 1 Tablet by mouth 2 times a day with meals. 60 Tablet 0       Allergies  Patient has no known allergies.    ROS  12 point review of systems reviewed the patient negative listed in the HPI. All other systems were reviewed and found to be negative    Family History   Problem Relation Age of Onset    No Known Problems Mother     No Known Problems Father        Social History     Socioeconomic History    Marital status: Single   Tobacco Use    Smoking status: Never    Smokeless tobacco: Never   Vaping Use    Vaping Use: Never used   Substance and Sexual Activity    Alcohol use: No    Drug use: No       Physical Exam  Vitals  BP (!) 152/90   Pulse 70   Temp 37.6 °C (99.6 °F) (Oral)   Resp 18   Ht 1.88 m (6' 2\")   Wt 91 kg (200 lb 9.9 oz)   SpO2 94%   General: Well Developed, Well Nourished, Age appropriate appearance  HEENT: " Normocephalic, atraumatic  Psych: Normal mood and affect  Neck: Supple, nontender, no masses  Lungs: Breathing unlabored, No audible wheezing  Heart: Regular heart rate and rhythm  Abdomen: Soft, NT, ND  Neuro: Sensation grossly intact to BUE and BLE, moving all four extremities  Skin: Intact, no open wounds  Vascular: , Capillary refill <2 seconds  MSK: Right upper extremity: Diffuse edema throughout the right hand and wrist.  Compartments are soft compressible.  There is no concern for compartment syndrome.  He does have tenderness to palpation primarily over the wrist crease over the carpal tunnel.  There is no obvious abscess or fluid collection on exam.  Sensation is present to light touch to the thumb index and middle finger.  He does have some pain with passive stretch of the digits concerning for tenosynovitis involving his flexor tendons in his wrist.  Brisk capillary refill is present to all fingers.  Multiple scars around the right wrist.  Patient states they are from working.  Forearm is soft and compressible.      Radiographs:  CT-EXTREMITY, UPPER WITH RIGHT   Final Result      1.  There is a focal enhancing fluid collection adjacent to the distal ulna and pisiform bone which could be a small soft tissue abscess.   2.  There is diffuse subcutaneous and intramuscular edema.   3.  Subchondral cystic changes in the carpal bones with possible small erosion on the ulnar side of the lunate and at the base of the 1st proximal phalanx. Possibility of an erosive arthropathy or possible infectious etiology could be considered.      DX-WRIST-LIMITED 2- RIGHT   Final Result         No acute osseous abnormality.          Laboratory Values  Recent Labs     06/22/23 1847   WBC 20.6*   RBC 4.70   HEMOGLOBIN 14.9   HEMATOCRIT 41.8*   MCV 88.9   MCH 31.7   MCHC 35.6   RDW 41.8   PLATELETCT 231   MPV 10.6     Recent Labs     06/22/23  1847   SODIUM 134*   POTASSIUM 3.5*   CHLORIDE 97   CO2 24   GLUCOSE 122*   BUN 9    CPKTOTAL 367*             Impression: 52-year-old male history of IV drug abuse presents with 3 days of right hand swelling and pain, exam concerning for a infectious tenosynovitis at the level of the carpal tunnel of the right wrist.  There is no obvious abscess here.  CT demonstrated possible small fluid collection in the hypothenar region but this is not where he is most tender.    Plan:  Admit to medicine  Start broad-spectrum antibiotics for significant infectious tenosynovitis.  Recommend vanc and Zosyn  We will reevaluate in the a.m., keep n.p.o. after midnight  Possible surgery for right wrist I&D in the AM if not improving on abx     This consult was requested by the emergency room physician, to be done while patient is in hospital. By definition this request is urgent and could not be delayed or done on an outpatient basis.    Surgical treatment of infection is urgent as delay in intervention can result in worsening infection, subsequent amputation or more proximal amputation, sepsis, multisystem organ failure and death.    Lj Montgomery MD  Orthopedic Trauma Fellow

## 2023-06-23 NOTE — CARE PLAN
The patient is Stable - Low risk of patient condition declining or worsening    Shift Goals  Clinical Goals: Pain control, NPO at midnight, rest    Progress made toward(s) clinical / shift goals:  Patient verbalized understanding of plan of care. Patient's pain is managed with current pharmacologic and nonpharmacologic interventions. Patient verbalizes understanding to call for assistance before getting out of bed. Skin interventions per flowsheets.    Patient is not progressing towards the following goals:

## 2023-06-23 NOTE — ANESTHESIA POSTPROCEDURE EVALUATION
Patient: Vishal Parrish    Procedure Summary     Date: 06/23/23 Room / Location: [place] [address] / SURGERY [facility]    Anesthesia Start: 0730 Anesthesia Stop: 0854    Procedures:       INCISION AND DRAINAGE, HAND (Right: Hand)      RELEASE, CARPAL TUNNEL (Right: Wrist) Diagnosis: (Right hand pain and swelling concern for infection)    Surgeons: Lj Montgomery M.D. Responsible Provider: Haider Davidson M.D.    Anesthesia Type: general ASA Status: 2          Final Anesthesia Type: general  Last vitals  BP   Blood Pressure: (!) 146/83    Temp   37.5 °C (99.5 °F)    Pulse   83   Resp   15    SpO2   94 %      Anesthesia Post Evaluation    Patient location during evaluation: PACU  Patient participation: complete - patient participated  Level of consciousness: awake and alert  Pain score: 4    Airway patency: patent  Anesthetic complications: no  Cardiovascular status: hemodynamically stable  Respiratory status: acceptable  Hydration status: euvolemic    PONV: none          No notable events documented.     Nurse Pain Score: 1 (NPRS)

## 2023-06-23 NOTE — PROGRESS NOTES
Admitted to room T 311 via transport in [facility] from ER at 2320.  Received report from DIMITRI Luciano.  Patient reports pain at 4 on a scale of 0-10. Educated patient regarding pharmacologic and non pharmacologic modalities for pain management. Oriented to room call light and smoking policy.  Reviewed plan of care (equipment, incentive spirometer, sequential compression devices, medications, activity, diet, fall precautions, skin care, and pain) with patient and family.     AA&Ox4. Denies CP/SOB.  See 2 RN skin note  Tolerating regular diet. Denies N/V. NPO at midnight.  + void. - BM. Last BM PTA.  Pt ambulates with stand by assistance.   SCD's refused.  Pt on 0L O2.      Plan of care discussed, all questions answered. Educated on the importance of calling before getting OOB and pt verbalizes understanding. Call light is within reach, treaded slipper socks on, bed in lowest/ locked position, hourly rounding in place, all needs met at this time.

## 2023-06-23 NOTE — ED TRIAGE NOTES
Vishal Parrish  52 y.o.  Chief Complaint   Patient presents with    Hand Swelling     RIGHT  10/10 burning pain  Swelling extend to wrist     BIB EMS ambulatory to triage with steady gait. A & O x 4, GCS 15. Restless in triage, unable to sit still.    Patient was seen in this ED earlier today for the same, diagnosed with Gout. Did NOT fill his discharge prescriptions.    PTA received Ibuprofen 600 mg PO and Tylenol 1000 mg PO from EMS.    Triage process explained to patient, apologized for wait time, and returned to lobby.

## 2023-06-23 NOTE — ANESTHESIA PROCEDURE NOTES
Airway    Date/Time: 6/23/2023 7:37 AM    Performed by: Haider Davidson M.D.  Authorized by: Haider Davidson M.D.    Location:  OR  Urgency:  Elective  Difficult Airway: No    Indications for Airway Management:  Anesthesia      Spontaneous Ventilation: absent    Sedation Level:  Deep  Preoxygenated: Yes    Final Airway Type:  Supraglottic airway  Final Supraglottic Airway:  Standard LMA    SGA Size:  4  Number of Attempts at Approach:  1  Number of Other Approaches Attempted:  0

## 2023-06-23 NOTE — PROGRESS NOTES
Hospital Medicine Daily Progress Note    Date of Service  6/23/2023    Chief Complaint  Vishal Parrish is a 52 y.o. male admitted 6/22/2023 with right hand cellulitis and abscess.    Hospital Course  Admitted with right hand cellulitis and abscess.  Patient was started on empiric coverage with IV vancomycin and Unasyn.  Orthopedic surgery was consulted on the case.    Interval Problem Update  Hand cellulitis/abscess - underwent I&D today, pain controlled    I have discussed this patient's plan of care and discharge plan at IDT rounds today with Case Management, Nursing, Nursing leadership, and other members of the IDT team.    Consultants/Specialty  orthopedics    Code Status  Full Code    Disposition  The patient is not medically cleared for discharge to home or a post-acute facility.  Anticipate discharge to: home with close outpatient follow-up    I have placed the appropriate orders for post-discharge needs.    Review of Systems  Review of Systems   Constitutional:  Negative for chills, diaphoresis, fever and malaise/fatigue.   HENT:  Negative for congestion, hearing loss and sore throat.    Eyes:  Negative for blurred vision.   Respiratory:  Negative for cough, hemoptysis, shortness of breath and wheezing.    Cardiovascular:  Negative for chest pain, palpitations and leg swelling.   Gastrointestinal:  Negative for abdominal pain, diarrhea, heartburn, nausea and vomiting.   Genitourinary:  Negative for dysuria, flank pain and hematuria.   Musculoskeletal:  Positive for joint pain. Negative for back pain, myalgias and neck pain.   Skin:  Negative for rash.   Neurological:  Positive for weakness. Negative for dizziness, sensory change, speech change, focal weakness and headaches.   Psychiatric/Behavioral:  The patient is not nervous/anxious.         Physical Exam  Temp:  [36.8 °C (98.2 °F)-37.7 °C (99.8 °F)] 36.9 °C (98.4 °F)  Pulse:  [60-91] 81  Resp:  [14-20] 16  BP: (116-164)/(75-97) 125/75  SpO2:  [90 %-100 %]  97 %    Physical Exam  Vitals and nursing note reviewed.   HENT:      Head: Normocephalic and atraumatic.      Nose: No congestion.      Mouth/Throat:      Mouth: Mucous membranes are moist.   Eyes:      Extraocular Movements: Extraocular movements intact.      Conjunctiva/sclera: Conjunctivae normal.   Cardiovascular:      Rate and Rhythm: Normal rate and regular rhythm.   Pulmonary:      Effort: Pulmonary effort is normal.      Breath sounds: Normal breath sounds.   Abdominal:      General: There is no distension.      Tenderness: There is no abdominal tenderness. There is no guarding or rebound.   Musculoskeletal:         General: Swelling (right hand) present.      Cervical back: No tenderness.   Skin:     General: Skin is warm and dry.   Neurological:      General: No focal deficit present.      Mental Status: He is alert and oriented to person, place, and time.      Cranial Nerves: No cranial nerve deficit.         Fluids    Intake/Output Summary (Last 24 hours) at 6/23/2023 1251  Last data filed at 6/23/2023 0950  Gross per 24 hour   Intake 500 ml   Output 665 ml   Net -165 ml       Laboratory  Recent Labs     06/22/23  1847 06/23/23  0110   WBC 20.6* 26.9*   RBC 4.70 4.36*   HEMOGLOBIN 14.9 13.3*   HEMATOCRIT 41.8* 39.0*   MCV 88.9 89.4   MCH 31.7 30.5   MCHC 35.6 34.1   RDW 41.8 42.5   PLATELETCT 231 191   MPV 10.6 10.7     Recent Labs     06/22/23  1847 06/23/23  0110   SODIUM 134* 134*   POTASSIUM 3.5* 3.8   CHLORIDE 97 96   CO2 24 23   GLUCOSE 122* 127*   BUN 9 10   CREATININE 1.09 1.04   CALCIUM 9.8 9.3                   Imaging  CT-EXTREMITY, UPPER WITH RIGHT   Final Result      1.  There is a focal enhancing fluid collection adjacent to the distal ulna and pisiform bone which could be a small soft tissue abscess.   2.  There is diffuse subcutaneous and intramuscular edema.   3.  Subchondral cystic changes in the carpal bones with possible small erosion on the ulnar side of the lunate and at the base  of the 1st proximal phalanx. Possibility of an erosive arthropathy or possible infectious etiology could be considered.      DX-WRIST-LIMITED 2- RIGHT   Final Result         No acute osseous abnormality.           Assessment/Plan  * Right hand cellulitis and abscess- (present on admission)  Assessment & Plan  Incision and drainage right hand abscess, right carpal tunnel release   6/23/2023  IV Vancomycin and Unasyn  Follow cultures  Pain control, wound care  MRSA PCR pending    Elevated BP without diagnosis of hypertension- (present on admission)  Assessment & Plan  IV Labetalol as needed with parameters    Hyperglycemia- (present on admission)  Assessment & Plan  Check [id number]    Hypokalemia- (present on admission)  Assessment & Plan  Follow bmp         VTE prophylaxis: enoxaparin ppx    I have performed a physical exam and reviewed and updated ROS and Plan today (6/23/2023). In review of yesterday's note (6/22/2023), there are no changes except as documented above.

## 2023-06-23 NOTE — ANESTHESIA TIME REPORT
Anesthesia Start and Stop Event Times     Date Time Event    6/23/2023 0730 Anesthesia Start     0733 Ready for Procedure     0854 Anesthesia Stop        Responsible Staff  06/23/23    Name Role Begin End    Haider Davidson M.D. Anesth 0730 0854        Overtime Reason:  no overtime (within assigned shift)    Comments:

## 2023-06-23 NOTE — ANESTHESIA PREPROCEDURE EVALUATION
Case: [id number] Date/Time: 06/23/23 0715    Procedures:       INCISION AND DRAINAGE, HAND (Right: Hand)      RELEASE, CARPAL TUNNEL (Right: Wrist)    Anesthesia type: General    Pre-op diagnosis: Right hand pain and swelling concern for infection    Location: [facility] OR 16 / SURGERY [facility]    Surgeons: Lj Montgomery M.D.          Relevant Problems   PULMONARY   (positive) Asthma   (positive) CAP (community acquired pneumonia)      CARDIAC   (positive) Hypertension       Physical Exam    Airway   Mallampati: II  TM distance: >3 FB  Neck ROM: full       Cardiovascular - normal exam  Rhythm: regular  Rate: normal  (-) murmur     Dental - normal exam           Pulmonary - normal exam  Breath sounds clear to auscultation     Abdominal    Neurological - normal exam                 Anesthesia Plan    ASA 2       Plan - general       Airway plan will be LMA          Induction: intravenous    Postoperative Plan: Postoperative administration of opioids is intended.    Pertinent diagnostic labs and testing reviewed    Informed Consent:    Anesthetic plan and risks discussed with patient.    Use of blood products discussed with: patient whom consented to blood products.

## 2023-06-23 NOTE — ASSESSMENT & PLAN NOTE
Incision and drainage right hand abscess, right carpal tunnel release   6/23/2023  IV [place]  Follow cultures  Pain control, wound care  NWB RUEtransition to Augmentin 875/25 twice daily and give another 4-week course  ID consulted -

## 2023-06-23 NOTE — PROGRESS NOTES
"Pharmacy Vancomycin Kinetics Note for 6/22/2023     52 y.o. male on Vancomycin day # 1     Vancomycin Indication (AUC Dosing): Skin/skin structure infection    Provider specified end date: 06/29/23    Active Antibiotics (From admission, onward)      Ordered     Ordering Provider       Thu Jun 22, 2023  9:30 PM    06/22/23 2130  vancomycin (VANCOCIN) 2,250 mg in  mL IVPB  (vancomycin (VANCOCIN) IV (LD + Maintenance))  ONCE         Jesus Bryan M.D.       u Jun 22, 2023  9:22 PM    06/22/23 2122  ampicillin/sulbactam (UNASYN) 3 g in  mL IVPB  EVERY 6 HOURS         Jesus Bryan M.D.    06/22/23 2122  MD Alert...Vancomycin per Pharmacy  PHARMACY TO DOSE        Question:  Indication(s) for vancomycin?  Answer:  Skin and soft tissue infection    Jesus Bryan M.D.            Dosing Weight: 91 kg (200 lb 9.9 oz)      Admission History: Admitted on 6/22/2023 for Hand abscess [L02.519]  Pertinent history: Admitted for tenosynovitis. History of IVDU.    Allergies:     Patient has no known allergies.     Pertinent cultures to date:     Results       Procedure Component Value Units Date/Time    Blood Culture [[id number]] Collected: 06/22/23 1921    Order Status: Sent Specimen: Blood from Peripheral Updated: 06/22/23 1954    Narrative:      1 of 2 for Blood Culture x 2 sites order. Per Hospital  Policy: Only change Specimen Src: to \"Line\" if specified by  physician order.    Blood Culture [[id number]] Collected: 06/22/23 1847    Order Status: Sent Specimen: Blood from Peripheral Updated: 06/22/23 1919    Narrative:      2 of 2 blood culture x2  Sites order. Per Hospital Policy:  Only change Specimen Src: to \"Line\" if specified by physician  order.            Labs:     Estimated Creatinine Clearance: 93.2 mL/min (by C-G formula based on SCr of 1.09 mg/dL).  Recent Labs     06/22/23 1847   WBC 20.6*   NEUTSPOLYS 90.20*     Recent Labs     06/22/23 1847   BUN 9   CREATININE 1.09   ALBUMIN 4.6     No " "intake or output data in the 24 hours ending 23 2132   BP (!) 152/90   Pulse 70   Temp 37.6 °C (99.6 °F) (Oral)   Resp 18   Ht 1.88 m (6' 2\")   Wt 91 kg (200 lb 9.9 oz)   SpO2 94%  Temp (24hrs), Av.6 °C (99.6 °F), Min:37.6 °C (99.6 °F), Max:37.6 °C (99.6 °F)      List concerns for Vancomycin clearance:     None    Pharmacokinetics:     AUC kinetics:   Ke (hr ^-1): 0.0816 hr^-1  Half life: 8.49 hr  Clearance: 4.827  Estimated TDD: 2413.5  Estimated Dose: 1056  Estimated interval: 10.5      A/P:     -  Vancomycin dose: 1250mg IV q12h    -  Predicted vancomycin AUC from initial AUC test calculator: 518 mg·hr/L    -  Comments: Estimated AUC within goal. MRSA nares ordered. Minimal concerns for accumulation. Anticipate renal indices to be at baseline, if not will improve with fluids.     Terrell Gant, PharmD    "

## 2023-06-23 NOTE — OR NURSING
0850: Pt arrives to PACU asleep and calm. VSS. Right hand is ace bandaged.and elevated on pillow. Ice pack applied.    0945: Dressing CDI. Pt denies pain or nausea. Report called to Lizett MOSLEY.

## 2023-06-23 NOTE — OP REPORT
DATE OF OPERATION: 6/23/2023     PREOPERATIVE DIAGNOSIS: Right hand abscess  Right hand tenosynovitis  Right carpal tunnel syndrome    POSTOPERATIVE DIAGNOSIS: Same    PROCEDURE PERFORMED: Incision and drainage right hand abscess, right carpal tunnel release    SURGEON: Lj Montgomery M.D.     ASSISTANT: None    ANESTHESIA: General    SPECIMEN: None    ESTIMATED BLOOD LOSS: 5 mL    Tourniquet time: 40 minutes    Drains: 2 Penrose drains were placed into the carpal tunnel    Specimens: Wound culture swab from the right wrist carpal tunnel abscess was sent to the lab for culture    Findings: Significant pressure inside the carpal tunnel.  Upon release of the carpal tunnel there was immediate release of significant purulent fluid.  Approximately 10 cc of pus was found and drained in the carpal tunnel.  The median nerve was intact but had some hyperemic appearance.  Significant tenosynovitis involving the flexor tendons of the carpal tunnel.      INDICATIONS: The patient is a 52 y.o. male who presented with right hand and wrist pain for 3 days.  He has a long history of drug abuse.  Denied any significant trauma.  He had an elevated white blood cell count.  He was admitted overnight for IV antibiotics with no improvement of his symptoms and worsening of his median nerve symptoms.  I discussed the risks and benefits of the procedure which include but are not limited to risks of infection, wound healing complication, neurovascular injury, pain,and the medical risks of anesthesia including MI, stroke, and death.  Alternatives to surgery were also discussed, including non-operative management, which I did not recommend.  The patient was in agreement with the plan to proceed, and the informed consent was signed and documented.  I met with the patient pre-operatively and marked the operative extremity with their agreement.  We proceeded to the operating room.     DESCRIPTION OF PROCEDURE:  Patient was seen in the  preoperative holding area on the day of surgery. The operative site was marked with my initials.  he was taken to the operating room and placed supine on the operative table.  Anesthesia was induced.  The operative extremity was prepped and draped in the normal sterile fashion.  Operative pause was conducted and the correct patient, site, side, procedure, and surgeon's initials on extremity were identified.      The tourniquet was inflated.  A extended approach to the right carpal tunnel was performed using a 15 blade scalpel.  Skin flaps over the palmar fascia were developed.  Careful sharp dissection was carried down to the palmar fascia until we reached the carpal tunnel.  Carpal tunnel was carefully opened under direct visualization.  It was released completely from proximal to distal.  The median nerve was identified.  It was found to be intact but with significant hyperemia.  The flexor tendons were significantly swollen with evidence of tenosynovitis and there was a lot of pressure in the carpal tunnel.  Upon release of the carpal tunnels purulent fluid began to drain out.  A hemostat was used to bluntly dissect beneath the tendons proximally and distally.  Approximately 10 cc of pus were evacuated from the carpal tunnel.  Culture was sent to the lab for microbiology analysis.  The carpal tunnel was irrigated with a liter of normal saline both proximally and distally.  Blunt dissection was used to break up any remaining loculations in the hand and the wrist.  All dissection was carried out ulnar to the median nerve.  The recurrent branch of the median nerve was not seen throughout the procedure as we were ulnar to it.  Once we are done irrigating there was no longer any purulent fluid able to be expressed.    1/4 inch Penrose drain was placed distally in the carpal tunnel of the hand in the deep space of the hand.  An additional quarter inch Penrose drain was placed proximally in the wrist starting in the  carpal tunnel and extending down over the distal radius.  Both were passed through the skin.  They were not sutured in.  The tourniquet was let down.  Meticulous hemostasis was obtained.  Sterile dressings were applied and he was placed into a splint in intrinsic plus position to assist with swelling.  All counts were correct at the end of the case.  Patient tolerated the procedure well was extubated and transferred back to the PACU in stable condition.    POSTOPERATIVE PLAN:      Nonweightbearing to the right upper extremity.  Follow-up intraoperative cultures  Continue broad-spectrum antibiotics until cultures result  Consult ID for complex infection involving the carpal tunnel of the right hand.  Plan to remove Penrose drains on postop day 3, Monday, June 26, 2023  At that time if his symptoms are improving and his final recommendations from ID are complete then he will likely be amenable for discharge at that time.      ____________________________________   Lj Montgomery M.D.   DD: 6/23/2023  8:55 AM

## 2023-06-23 NOTE — ED PROVIDER NOTES
"ED Provider Note    Primary care provider: Pcp Pt States None    CHIEF COMPLAINT  Chief Complaint   Patient presents with    Hand Swelling     RIGHT  10/10 burning pain  Swelling extend to wrist       HPI  Vishal Parrish is a 52 y.o. male who presents to the Emergency Department severe intractable pain beginning yesterday evening.  Pain was gradual in onset with associated swelling throughout the right hand and right forearm.  The patient has not been able to sleep secondary to the pain.  Denies any fevers or chills.  Denies any history of gout, no history of diabetes.  Denies any trauma.  The patient works as a low-voltage , is pulling wires on a regular basis.  This is not a new occupation for him.    External medical record review: Patient seen at the urgent care for a finger laceration last month, this was several days old and healed by secondary intention.  He was seen in our emergency department today for wrist pain, x-rays show erosive changes on his first metacarpal concerning for gout.  He was treated with Naprosyn and discharged.    REVIEW OF SYSTEMS  See HPI.     PAST MEDICAL HISTORY   has a past medical history of Asthma.    SURGICAL HISTORY  patient denies any surgical history    SOCIAL HISTORY  Social History     Tobacco Use    Smoking status: Never    Smokeless tobacco: Never   Vaping Use    Vaping Use: Never used   Substance Use Topics    Alcohol use: No    Drug use: No      Social History     Substance and Sexual Activity   Drug Use No       FAMILY HISTORY  Family History   Problem Relation Age of Onset    No Known Problems Mother     No Known Problems Father        CURRENT MEDICATIONS  Reviewed.  See Encounter Summary.     ALLERGIES  No Known Allergies    PHYSICAL EXAM  VITAL SIGNS: BP (!) 152/90   Pulse 70   Temp 37.6 °C (99.6 °F) (Oral)   Resp 18   Ht 1.88 m (6' 2\")   Wt 91 kg (200 lb 9.9 oz)   SpO2 94%   BMI 25.76 kg/m²   Constitutional: Awake, alert in no apparent distress.  " Appears uncomfortable.  HENT: Normocephalic, Bilateral external ears normal. Nose normal.   Eyes: Conjunctiva normal, non-icteric, EOMI.    Thorax & Lungs: Easy unlabored respirations  Cardiovascular: Brisk capillary refill, radial pulse 2+.  Abdomen:  No distention  Skin: Visualized skin is  Dry, No erythema, No rash.   Extremities:   Prominent swelling of the distal third of the forearm, most notably over the volar aspect, swelling throughout the hand and all 5 digits, patient does hold the fingers in flexion, pain with extension of the fingers.  Patient unable to extend any of his fingers.    Neurologic: Alert, Grossly non-focal.   Psychiatric: Affect and Mood normal      RADIOLOGY  I have independently interpreted the diagnostic imaging associated with this visit and am waiting the final reading from the radiologist.   My preliminary interpretation is as follows: Edema noted in the skin and subcutaneous tissue on CT, there does appear to be a circumscribed hypoechoic region on the ulnar aspect of the hand, possible abscess.    Radiologist interpretation:   CT-EXTREMITY, UPPER WITH RIGHT   Final Result      1.  There is a focal enhancing fluid collection adjacent to the distal ulna and pisiform bone which could be a small soft tissue abscess.   2.  There is diffuse subcutaneous and intramuscular edema.   3.  Subchondral cystic changes in the carpal bones with possible small erosion on the ulnar side of the lunate and at the base of the 1st proximal phalanx. Possibility of an erosive arthropathy or possible infectious etiology could be considered.      DX-WRIST-LIMITED 2- RIGHT   Final Result         No acute osseous abnormality.          COURSE & MEDICAL DECISION MAKING  Pertinent Labs & Imaging studies reviewed. (See chart for details)    Differential diagnoses include but are not limited to: Cellulitis, flexor tenosynovitis, deep space abscess, overuse, rhabdomyolysis    6:06 PM - Nursing notes reviewed,  patient seen and examined at bedside.    7:27 PM: Discussed with Dr. Larson, orthopedic surgery, recommends CT hand, he will evaluate after the CT has returned.    Discussion of management with other medical personnel: Orthopedics, hospitalist    9:06 PM: Patient reassessed, still having significant pain, does appear to be slightly more erythematous on the forearm.  I applied a bedside ultrasound, cannot identify the abscess seen on CT.  Dr. Larson, orthopedics at bedside.    Decision tools and prescription drugs considered including, but not limited to: LRINEC score 3    Decision Making:  This is a pleasant 52 y.o. year old male who presents with intractable pain, impressive swelling and erythema of the hand inability to range the wrist or hand.  The patient was evaluated earlier today, presumably his swelling has intensified over the past 12 hours.   He has a leukocytosis of 20.6, 90% neutrophils.  The patient was given Ancef shortly after labs returned and blood cultures were obtained.  Lactate somewhat reassuring at 1.8.  CPK only elevated to 367, not concerning for rhabdomyolysis.  Compartments are soft.  CRP mildly elevated at 6.78, sed rate 9.    Discussed case with orthopedics who recommended CT.  CT does show inflammation, no gas, no findings concerning for necrotizing fasciitis.  There does appear to be significant mount of inflammation and a small abscess.    CRITICAL CARE  The very real possibilty of a deterioration of this patient's condition required the highest level of my preparedness for sudden, emergent intervention.  I provided critical care services, which included medication orders, frequent reevaluations of the patient's condition and response to treatment, ordering and reviewing test results, and discussing the case with various consultants.  The critical care time associated with the care of the patient was 35 minutes. Review chart for interventions. This time is exclusive of any other  billable procedures.       Patient will be hospitalized in guarded condition.      FINAL IMPRESSION  1. Cellulitis of hand    2. Sepsis, due to unspecified organism, unspecified whether acute organ dysfunction present (HCC)

## 2023-06-23 NOTE — PROGRESS NOTES
Right hand did not improve this morning after IV antibiotics.    Worsening numbness to the R thumb, index and middle finger conceding for evolving carpal tunnel syndrome      To OR for today I&D R hand, and carpal Tunnel release

## 2023-06-23 NOTE — ED NOTES
Patient ambulated to [facility] without incident . Primary assessment complete. Patient oriented to care area. Chart up for ERP.

## 2023-06-24 PROBLEM — E83.42 HYPOMAGNESEMIA: Status: ACTIVE | Noted: 2023-06-24

## 2023-06-24 PROBLEM — E83.39 HYPOPHOSPHATEMIA: Status: ACTIVE | Noted: 2023-06-24

## 2023-06-24 LAB
ANION GAP SERPL CALC-SCNC: 10 MMOL/L (ref 7–16)
BUN SERPL-MCNC: 13 MG/DL (ref 8–22)
CALCIUM SERPL-MCNC: 8.9 MG/DL (ref 8.5–10.5)
CHLORIDE SERPL-SCNC: 104 MMOL/L (ref 96–112)
CO2 SERPL-SCNC: 23 MMOL/L (ref 20–33)
CREAT SERPL-MCNC: 1.02 MG/DL (ref 0.5–1.4)
ERYTHROCYTE [DISTWIDTH] IN BLOOD BY AUTOMATED COUNT: 45.3 FL (ref 35.9–50)
GFR SERPLBLD CREATININE-BSD FMLA CKD-EPI: 89 ML/MIN/1.73 M 2
GLUCOSE SERPL-MCNC: 121 MG/DL (ref 65–99)
HCT VFR BLD AUTO: 37.8 % (ref 42–52)
HGB BLD-MCNC: 12.7 G/DL (ref 14–18)
MCH RBC QN AUTO: 30.7 PG (ref 27–33)
MCHC RBC AUTO-ENTMCNC: 33.6 G/DL (ref 32.3–36.5)
MCV RBC AUTO: 91.3 FL (ref 81.4–97.8)
PLATELET # BLD AUTO: 199 K/UL (ref 164–446)
PMV BLD AUTO: 10.8 FL (ref 9–12.9)
POTASSIUM SERPL-SCNC: 4.3 MMOL/L (ref 3.6–5.5)
RBC # BLD AUTO: 4.14 M/UL (ref 4.7–6.1)
SCCMEC + MECA PNL NOSE NAA+PROBE: NEGATIVE
SODIUM SERPL-SCNC: 137 MMOL/L (ref 135–145)
WBC # BLD AUTO: 24.7 K/UL (ref 4.8–10.8)

## 2023-06-24 PROCEDURE — 700111 HCHG RX REV CODE 636 W/ 250 OVERRIDE (IP): Performed by: PHYSICIAN ASSISTANT

## 2023-06-24 PROCEDURE — 36415 COLL VENOUS BLD VENIPUNCTURE: CPT

## 2023-06-24 PROCEDURE — 700111 HCHG RX REV CODE 636 W/ 250 OVERRIDE (IP): Performed by: FAMILY MEDICINE

## 2023-06-24 PROCEDURE — 700111 HCHG RX REV CODE 636 W/ 250 OVERRIDE (IP): Performed by: INTERNAL MEDICINE

## 2023-06-24 PROCEDURE — 770001 HCHG ROOM/CARE - MED/SURG/GYN PRIV*

## 2023-06-24 PROCEDURE — 700105 HCHG RX REV CODE 258: Performed by: FAMILY MEDICINE

## 2023-06-24 PROCEDURE — 85027 COMPLETE CBC AUTOMATED: CPT

## 2023-06-24 PROCEDURE — 87641 MR-STAPH DNA AMP PROBE: CPT

## 2023-06-24 PROCEDURE — A9270 NON-COVERED ITEM OR SERVICE: HCPCS | Performed by: INTERNAL MEDICINE

## 2023-06-24 PROCEDURE — 80048 BASIC METABOLIC PNL TOTAL CA: CPT

## 2023-06-24 PROCEDURE — 700102 HCHG RX REV CODE 250 W/ 637 OVERRIDE(OP): Performed by: INTERNAL MEDICINE

## 2023-06-24 PROCEDURE — 99232 SBSQ HOSP IP/OBS MODERATE 35: CPT | Performed by: FAMILY MEDICINE

## 2023-06-24 PROCEDURE — 700105 HCHG RX REV CODE 258: Performed by: INTERNAL MEDICINE

## 2023-06-24 RX ORDER — SODIUM CHLORIDE 9 MG/ML
INJECTION, SOLUTION INTRAVENOUS CONTINUOUS
Status: DISCONTINUED | OUTPATIENT
Start: 2023-06-24 | End: 2023-06-25

## 2023-06-24 RX ORDER — MAGNESIUM SULFATE HEPTAHYDRATE 40 MG/ML
2 INJECTION, SOLUTION INTRAVENOUS ONCE
Status: COMPLETED | OUTPATIENT
Start: 2023-06-24 | End: 2023-06-24

## 2023-06-24 RX ADMIN — ACETAMINOPHEN 650 MG: 325 TABLET, FILM COATED ORAL at 05:39

## 2023-06-24 RX ADMIN — SENNOSIDES AND DOCUSATE SODIUM 2 TABLET: 50; 8.6 TABLET ORAL at 05:31

## 2023-06-24 RX ADMIN — AMPICILLIN AND SULBACTAM 3 G: 1; 2 INJECTION, POWDER, FOR SOLUTION INTRAMUSCULAR; INTRAVENOUS at 05:32

## 2023-06-24 RX ADMIN — CEFTRIAXONE SODIUM 2000 MG: 10 INJECTION, POWDER, FOR SOLUTION INTRAVENOUS at 16:12

## 2023-06-24 RX ADMIN — OXYCODONE HYDROCHLORIDE 2.5 MG: 5 TABLET ORAL at 18:32

## 2023-06-24 RX ADMIN — VANCOMYCIN HYDROCHLORIDE 1250 MG: 5 INJECTION, POWDER, LYOPHILIZED, FOR SOLUTION INTRAVENOUS at 00:14

## 2023-06-24 RX ADMIN — MAGNESIUM SULFATE HEPTAHYDRATE 2 G: 2 INJECTION, SOLUTION INTRAVENOUS at 08:39

## 2023-06-24 RX ADMIN — SODIUM CHLORIDE: 9 INJECTION, SOLUTION INTRAVENOUS at 08:37

## 2023-06-24 RX ADMIN — SENNOSIDES AND DOCUSATE SODIUM 2 TABLET: 50; 8.6 TABLET ORAL at 16:12

## 2023-06-24 RX ADMIN — ENOXAPARIN SODIUM 40 MG: 100 INJECTION SUBCUTANEOUS at 16:12

## 2023-06-24 RX ADMIN — OXYCODONE HYDROCHLORIDE 5 MG: 5 TABLET ORAL at 22:53

## 2023-06-24 RX ADMIN — VANCOMYCIN HYDROCHLORIDE 1250 MG: 5 INJECTION, POWDER, LYOPHILIZED, FOR SOLUTION INTRAVENOUS at 11:53

## 2023-06-24 RX ADMIN — AMPICILLIN AND SULBACTAM 3 G: 1; 2 INJECTION, POWDER, FOR SOLUTION INTRAMUSCULAR; INTRAVENOUS at 11:13

## 2023-06-24 ASSESSMENT — PAIN DESCRIPTION - PAIN TYPE
TYPE: SURGICAL PAIN
TYPE: ACUTE PAIN
TYPE: SURGICAL PAIN
TYPE: SURGICAL PAIN

## 2023-06-24 ASSESSMENT — ENCOUNTER SYMPTOMS
FOCAL WEAKNESS: 0
WEAKNESS: 1
NERVOUS/ANXIOUS: 0
DIARRHEA: 0
SORE THROAT: 0
FLANK PAIN: 0
NECK PAIN: 0
BACK PAIN: 0
HEADACHES: 0
NAUSEA: 0
CHILLS: 0
PALPITATIONS: 0
ABDOMINAL PAIN: 0
BLURRED VISION: 0
FEVER: 0
HEMOPTYSIS: 0
VOMITING: 0
COUGH: 0
SPEECH CHANGE: 0
DIAPHORESIS: 0
SHORTNESS OF BREATH: 0
DIZZINESS: 0
MYALGIAS: 0
WHEEZING: 0
HEARTBURN: 0
SENSORY CHANGE: 0

## 2023-06-24 NOTE — PROGRESS NOTES
Ortho Progress Note    S: ELEANOR.  Pain much better today to the right wrist.  States numbness to the thumb index middle finger has resolved.  Still has some mild pain particularly with passive stretch.    O:  Vitals:    06/24/23 0457   BP: (!) 140/87   Pulse: 89   Resp: 17   Temp: (!) 38.3 °C (100.9 °F)   SpO2: 95%          Physical Exam:  Gen: Aox3  Resp: Stable on room air, unlabored respirations    RUE  Dressings clean dry and intact, compartments soft and compressible  Intact EPL and FPL function  Sensation present to light touch to the first DWS, Palmar aspect of IF, and Palmar aspect of small finger   hand still significantly swollen however erythema has improved.  Tenderness to palpation much improved.  Still some mild pain with passive stretch which is expected given the extensive tenosynovitis.  Brisk capillary refill present to all toes        Recent Labs     06/22/23 1847 06/23/23  0110 06/24/23  0238   WBC 20.6* 26.9* 24.7*   RBC 4.70 4.36* 4.14*   HEMOGLOBIN 14.9 13.3* 12.7*   HEMATOCRIT 41.8* 39.0* 37.8*   MCV 88.9 89.4 91.3   MCH 31.7 30.5 30.7   RDW 41.8 42.5 45.3   PLATELETCT 231 191 199   MPV 10.6 10.7 10.8   NEUTSPOLYS 90.20* 86.50*  --    LYMPHOCYTES 2.60* 4.80*  --    MONOCYTES 6.20 6.40  --    EOSINOPHILS 0.10 0.00  --    BASOPHILS 0.30 0.40  --        Recent Labs     06/22/23 1847 06/23/23  0110 06/24/23  0238   SODIUM 134* 134* 137   POTASSIUM 3.5* 3.8 4.3   CHLORIDE 97 96 104   CO2 24 23 23   GLUCOSE 122* 127* 121*   BUN 9 10 13   CPKTOTAL 367* 224*  --          Assessment: 52 y.o. male s/p Incision and drainage right hand abscess, right carpal tunnel release      Plan:  Weight bearing status: NWB RUE  Abx: Continue broad-spectrum antibiotics until cultures results  Consult ID for antibiotic regimen upon discharge once cultures result  Dressings changed today  2 Penrose drains still in place plan to DC on Monday    Dispo:  Pending final culture results, ID consult and Penrose drain  removal

## 2023-06-24 NOTE — PROGRESS NOTES
"      Orthopaedic Progress Note    Interval changes:  Patient doing well post op  R hand splint/ dressings are CDI  Cultures positive for Beta strep GC- ID to consult and see tomorrow  Abx changed to rocephin per Dr Crabtree    ROS - Patient denies any new issues.  Pain well controlled.    BP (!) 146/90   Pulse 88   Temp 36.7 °C (98.1 °F) (Temporal)   Resp 16   Ht 1.88 m (6' 2\")   Wt 91 kg (200 lb 9.9 oz)   SpO2 93%     Patient seen and examined  No acute distress  Breathing non labored  RRR  RUE in short arm splint CDI, DNVI, moves all fingers, cap refill <2 sec.     Recent Labs     06/22/23  1847 06/23/23  0110 06/24/23  0238   WBC 20.6* 26.9* 24.7*   RBC 4.70 4.36* 4.14*   HEMOGLOBIN 14.9 13.3* 12.7*   HEMATOCRIT 41.8* 39.0* 37.8*   MCV 88.9 89.4 91.3   MCH 31.7 30.5 30.7   MCHC 35.6 34.1 33.6   RDW 41.8 42.5 45.3   PLATELETCT 231 191 199   MPV 10.6 10.7 10.8       Active Hospital Problems    Diagnosis     Hypomagnesemia [E83.42]     Hypophosphatemia [E83.39]     Hypokalemia [E87.6]     Hyperglycemia [R73.9]     Elevated BP without diagnosis of hypertension [R03.0]     Cellulitis and abscess of hand [L03.119, L02.519]        Assessment/Plan:  Patient doing well post op  R hand splint/ dressings are CDI  Cultures positive for Beta strep GC- ID to consult and see tomorrow  Abx changed to rocephin per Dr Crabtree  POD#1 S/P Incision and drainage right hand abscess, right carpal tunnel release  Wt bearing status - NWB RUE  Wound care/Drains - Dressings/splint to be left in place    Future Procedures - non planned   Lovenox: Start 6/23, Duration-until ambulatory > 150'  Sutures/Staples out- 14-21 days post operatively. Removal will completed by ortho mid levels only.  PT/OT-initiated  Antibiotics: rocephin 2g IV QD  DVT Prophylaxis- TEDS/SCDs/Foot pumps  Shields-not needed per ortho  Case Coordination for Discharge Planning - Disposition per therapy recs.    "

## 2023-06-24 NOTE — PROGRESS NOTES
"Assumed care of patient at 1845. Bedside report received. Assessment complete.  AA&Ox4. Intermittently drowsy in conversation.   Denies CP/SOB.  Reporting 0/10 pain. No intervention necessary at this time.   Educated patient regarding pharmacologic and non pharmacologic modalities for pain management.  Skin per flowsheet.  Tolerating regular diet. Denies N/V at this time.   + void via urinal. - BM. Last BM PTA.   Pt ambulates with SBA.  All needs met at this time. Call light within reach. Bed alarm on and audible. Pt calls appropriately. Bed low and locked, non skid socks in place. Hourly rounding in place.     /78   Pulse 89   Temp 37.5 °C (99.5 °F) (Temporal)   Resp 17   Ht 1.88 m (6' 2\")   Wt 91 kg (200 lb 9.9 oz)   SpO2 94%   BMI 25.76 kg/m²     "

## 2023-06-24 NOTE — PROGRESS NOTES
Hospital Medicine Daily Progress Note    Date of Service  6/24/2023    Chief Complaint  Vishal Parrish is a 52 y.o. male admitted 6/22/2023 with right hand cellulitis and abscess.    Hospital Course  Admitted with right hand cellulitis and abscess.  Patient was started on empiric coverage with IV vancomycin and Unasyn.  Orthopedic surgery was consulted on the case.  He underwent Incision and drainage right hand abscess, right carpal tunnel release on 6/23/2023.    Interval Problem Update  Hand cellulitis/abscess - pain controlled, cultures and MRSA PCR pending  Low phosphorus and magnesium    I have discussed this patient's plan of care and discharge plan at IDT rounds today with Case Management, Nursing, Nursing leadership, and other members of the IDT team.    Consultants/Specialty  orthopedics    Code Status  Full Code    Disposition  The patient is not medically cleared for discharge to home or a post-acute facility.  Anticipate discharge to: home with close outpatient follow-up    I have placed the appropriate orders for post-discharge needs.    Review of Systems  Review of Systems   Constitutional:  Negative for chills, diaphoresis, fever and malaise/fatigue.   HENT:  Negative for congestion, hearing loss and sore throat.    Eyes:  Negative for blurred vision.   Respiratory:  Negative for cough, hemoptysis, shortness of breath and wheezing.    Cardiovascular:  Negative for chest pain, palpitations and leg swelling.   Gastrointestinal:  Negative for abdominal pain, diarrhea, heartburn, nausea and vomiting.   Genitourinary:  Negative for dysuria, flank pain and hematuria.   Musculoskeletal:  Positive for joint pain. Negative for back pain, myalgias and neck pain.   Skin:  Negative for rash.   Neurological:  Positive for weakness. Negative for dizziness, sensory change, speech change, focal weakness and headaches.   Psychiatric/Behavioral:  The patient is not nervous/anxious.         Physical Exam  Temp:  [36.6 °C  (97.9 °F)-38.3 °C (100.9 °F)] 38.3 °C (100.9 °F)  Pulse:  [] 89  Resp:  [16-17] 17  BP: (133-140)/(78-87) 140/87  SpO2:  [94 %-97 %] 95 %    Physical Exam  Vitals and nursing note reviewed.   HENT:      Head: Normocephalic and atraumatic.      Nose: No congestion.      Mouth/Throat:      Mouth: Mucous membranes are moist.   Eyes:      Extraocular Movements: Extraocular movements intact.      Conjunctiva/sclera: Conjunctivae normal.   Cardiovascular:      Rate and Rhythm: Normal rate and regular rhythm.   Pulmonary:      Effort: Pulmonary effort is normal.      Breath sounds: Normal breath sounds.   Abdominal:      General: There is no distension.      Tenderness: There is no abdominal tenderness. There is no guarding or rebound.   Musculoskeletal:         General: Swelling (right hand) present.      Cervical back: No tenderness.   Skin:     General: Skin is warm and dry.   Neurological:      General: No focal deficit present.      Mental Status: He is alert and oriented to person, place, and time.      Cranial Nerves: No cranial nerve deficit.         Fluids    Intake/Output Summary (Last 24 hours) at 6/24/2023 1100  Last data filed at 6/24/2023 0540  Gross per 24 hour   Intake 1853.95 ml   Output 1050 ml   Net 803.95 ml       Laboratory  Recent Labs     06/22/23 1847 06/23/23  0110 06/24/23  0238   WBC 20.6* 26.9* 24.7*   RBC 4.70 4.36* 4.14*   HEMOGLOBIN 14.9 13.3* 12.7*   HEMATOCRIT 41.8* 39.0* 37.8*   MCV 88.9 89.4 91.3   MCH 31.7 30.5 30.7   MCHC 35.6 34.1 33.6   RDW 41.8 42.5 45.3   PLATELETCT 231 191 199   MPV 10.6 10.7 10.8     Recent Labs     06/22/23 1847 06/23/23  0110 06/24/23  0238   SODIUM 134* 134* 137   POTASSIUM 3.5* 3.8 4.3   CHLORIDE 97 96 104   CO2 24 23 23   GLUCOSE 122* 127* 121*   BUN 9 10 13   CREATININE 1.09 1.04 1.02   CALCIUM 9.8 9.3 8.9                   Imaging  CT-EXTREMITY, UPPER WITH RIGHT   Final Result      1.  There is a focal enhancing fluid collection adjacent to the  distal ulna and pisiform bone which could be a small soft tissue abscess.   2.  There is diffuse subcutaneous and intramuscular edema.   3.  Subchondral cystic changes in the carpal bones with possible small erosion on the ulnar side of the lunate and at the base of the 1st proximal phalanx. Possibility of an erosive arthropathy or possible infectious etiology could be considered.      DX-WRIST-LIMITED 2- RIGHT   Final Result         No acute osseous abnormality.           Assessment/Plan  * Cellulitis and abscess of hand- (present on admission)  Assessment & Plan  Incision and drainage right hand abscess, right carpal tunnel release   6/23/2023  IV Vancomycin and Unasyn  Follow cultures  Pain control, wound care  MRSA PCR pending  NWB RUE  May need ID consult    Hypophosphatemia- (present on admission)  Assessment & Plan  Follow level    Hypomagnesemia- (present on admission)  Assessment & Plan  IV Mg 2 g  Follow level    Elevated BP without diagnosis of hypertension- (present on admission)  Assessment & Plan  IV Labetalol as needed with parameters    Hyperglycemia- (present on admission)  Assessment & Plan  hgba1c 5.6    Hypokalemia- (present on admission)  Assessment & Plan  Follow bmp         VTE prophylaxis: enoxaparin ppx    I have performed a physical exam and reviewed and updated ROS and Plan today (6/24/2023). In review of yesterday's note (6/23/2023), there are no changes except as documented above.

## 2023-06-24 NOTE — CARE PLAN
The patient is Watcher - Medium risk of patient condition declining or worsening    Shift Goals  Clinical Goals: rest, pain control, OOB activity, safety  Patient Goals: rest  Family Goals: NA    Progress made toward(s) clinical / shift goals:  Patient was able to rest intermittently overnight. Patient reported pain to be managed with scheduled medications. Educated on pharmacological and non pharmacological modalities. Patient remained safe and free from falls overnight. Patient was able to ambulate with SBA overnight. Bed alarm on and audible.     Patient is not progressing towards the following goals:

## 2023-06-24 NOTE — CARE PLAN
Problem: Pain - Standard  Goal: Alleviation of pain or a reduction in pain to the patient’s comfort goal  Outcome: Progressing     Problem: Knowledge Deficit - Standard  Goal: Patient and family/care givers will demonstrate understanding of plan of care, disease process/condition, diagnostic tests and medications  Outcome: Progressing       The patient is Watcher - Medium risk of patient condition declining or worsening    Shift Goals  Clinical Goals: Pain control, IV abx  Patient Goals: Comfort  Family Goals: NA    Progress made toward(s) clinical / shift goals:  Taught pt 0-10 pain scale and non-pharmacological method of pain management, encouraged to verbalize when in pain. Administered PRN pain meds as needed. IV antibiotics given per MAR. Monitoring lab trends.     Patient is not progressing towards the following goals:

## 2023-06-25 LAB
ANION GAP SERPL CALC-SCNC: 13 MMOL/L (ref 7–16)
BACTERIA WND AEROBE CULT: ABNORMAL
BACTERIA WND AEROBE CULT: ABNORMAL
BUN SERPL-MCNC: 11 MG/DL (ref 8–22)
CALCIUM SERPL-MCNC: 8.8 MG/DL (ref 8.5–10.5)
CHLORIDE SERPL-SCNC: 99 MMOL/L (ref 96–112)
CO2 SERPL-SCNC: 23 MMOL/L (ref 20–33)
CREAT SERPL-MCNC: 1.01 MG/DL (ref 0.5–1.4)
ERYTHROCYTE [DISTWIDTH] IN BLOOD BY AUTOMATED COUNT: 45 FL (ref 35.9–50)
GFR SERPLBLD CREATININE-BSD FMLA CKD-EPI: 90 ML/MIN/1.73 M 2
GLUCOSE SERPL-MCNC: 114 MG/DL (ref 65–99)
GRAM STN SPEC: ABNORMAL
HCT VFR BLD AUTO: 37.8 % (ref 42–52)
HGB BLD-MCNC: 12.7 G/DL (ref 14–18)
MAGNESIUM SERPL-MCNC: 1.7 MG/DL (ref 1.5–2.5)
MCH RBC QN AUTO: 30.8 PG (ref 27–33)
MCHC RBC AUTO-ENTMCNC: 33.6 G/DL (ref 32.3–36.5)
MCV RBC AUTO: 91.7 FL (ref 81.4–97.8)
PHOSPHATE SERPL-MCNC: 2.3 MG/DL (ref 2.5–4.5)
PLATELET # BLD AUTO: 195 K/UL (ref 164–446)
PMV BLD AUTO: 10.6 FL (ref 9–12.9)
POTASSIUM SERPL-SCNC: 3.9 MMOL/L (ref 3.6–5.5)
RBC # BLD AUTO: 4.12 M/UL (ref 4.7–6.1)
SIGNIFICANT IND 70042: ABNORMAL
SITE SITE: ABNORMAL
SODIUM SERPL-SCNC: 135 MMOL/L (ref 135–145)
SOURCE SOURCE: ABNORMAL
WBC # BLD AUTO: 14.1 K/UL (ref 4.8–10.8)

## 2023-06-25 PROCEDURE — 700105 HCHG RX REV CODE 258: Performed by: FAMILY MEDICINE

## 2023-06-25 PROCEDURE — 99232 SBSQ HOSP IP/OBS MODERATE 35: CPT | Performed by: FAMILY MEDICINE

## 2023-06-25 PROCEDURE — 700111 HCHG RX REV CODE 636 W/ 250 OVERRIDE (IP): Performed by: INTERNAL MEDICINE

## 2023-06-25 PROCEDURE — 700102 HCHG RX REV CODE 250 W/ 637 OVERRIDE(OP): Performed by: FAMILY MEDICINE

## 2023-06-25 PROCEDURE — 99223 1ST HOSP IP/OBS HIGH 75: CPT | Performed by: INTERNAL MEDICINE

## 2023-06-25 PROCEDURE — 85027 COMPLETE CBC AUTOMATED: CPT

## 2023-06-25 PROCEDURE — A9270 NON-COVERED ITEM OR SERVICE: HCPCS | Performed by: INTERNAL MEDICINE

## 2023-06-25 PROCEDURE — A9270 NON-COVERED ITEM OR SERVICE: HCPCS | Performed by: FAMILY MEDICINE

## 2023-06-25 PROCEDURE — 700111 HCHG RX REV CODE 636 W/ 250 OVERRIDE (IP): Performed by: PHYSICIAN ASSISTANT

## 2023-06-25 PROCEDURE — 83735 ASSAY OF MAGNESIUM: CPT

## 2023-06-25 PROCEDURE — 80048 BASIC METABOLIC PNL TOTAL CA: CPT

## 2023-06-25 PROCEDURE — 84100 ASSAY OF PHOSPHORUS: CPT

## 2023-06-25 PROCEDURE — 700111 HCHG RX REV CODE 636 W/ 250 OVERRIDE (IP): Performed by: FAMILY MEDICINE

## 2023-06-25 PROCEDURE — 700102 HCHG RX REV CODE 250 W/ 637 OVERRIDE(OP): Performed by: INTERNAL MEDICINE

## 2023-06-25 PROCEDURE — 770001 HCHG ROOM/CARE - MED/SURG/GYN PRIV*

## 2023-06-25 RX ORDER — MAGNESIUM SULFATE HEPTAHYDRATE 40 MG/ML
2 INJECTION, SOLUTION INTRAVENOUS ONCE
Status: COMPLETED | OUTPATIENT
Start: 2023-06-25 | End: 2023-06-25

## 2023-06-25 RX ADMIN — DIBASIC SODIUM PHOSPHATE, MONOBASIC POTASSIUM PHOSPHATE AND MONOBASIC SODIUM PHOSPHATE 250 MG: 852; 155; 130 TABLET ORAL at 08:26

## 2023-06-25 RX ADMIN — ENOXAPARIN SODIUM 40 MG: 100 INJECTION SUBCUTANEOUS at 17:49

## 2023-06-25 RX ADMIN — SENNOSIDES AND DOCUSATE SODIUM 2 TABLET: 50; 8.6 TABLET ORAL at 17:49

## 2023-06-25 RX ADMIN — DIBASIC SODIUM PHOSPHATE, MONOBASIC POTASSIUM PHOSPHATE AND MONOBASIC SODIUM PHOSPHATE 250 MG: 852; 155; 130 TABLET ORAL at 17:49

## 2023-06-25 RX ADMIN — MAGNESIUM SULFATE HEPTAHYDRATE 2 G: 2 INJECTION, SOLUTION INTRAVENOUS at 08:34

## 2023-06-25 RX ADMIN — CEFTRIAXONE SODIUM 2000 MG: 10 INJECTION, POWDER, FOR SOLUTION INTRAVENOUS at 04:24

## 2023-06-25 RX ADMIN — SODIUM CHLORIDE: 9 INJECTION, SOLUTION INTRAVENOUS at 08:33

## 2023-06-25 RX ADMIN — OXYCODONE HYDROCHLORIDE 2.5 MG: 5 TABLET ORAL at 08:26

## 2023-06-25 ASSESSMENT — PAIN DESCRIPTION - PAIN TYPE
TYPE: ACUTE PAIN
TYPE: ACUTE PAIN;SURGICAL PAIN
TYPE: ACUTE PAIN

## 2023-06-25 ASSESSMENT — ENCOUNTER SYMPTOMS
WEAKNESS: 0
HEADACHES: 0
FOCAL WEAKNESS: 0
VOMITING: 0
DOUBLE VISION: 0
NAUSEA: 0
DIARRHEA: 0
DIZZINESS: 0
SORE THROAT: 0
BLURRED VISION: 0
MYALGIAS: 1
FLANK PAIN: 0
CHILLS: 0
FEVER: 0
SHORTNESS OF BREATH: 0
MYALGIAS: 0
NECK PAIN: 0
WEAKNESS: 1
NERVOUS/ANXIOUS: 0
ABDOMINAL PAIN: 0
HEARTBURN: 0
HEMOPTYSIS: 0
WHEEZING: 0
COUGH: 0
CONSTIPATION: 0
SENSORY CHANGE: 0
SPUTUM PRODUCTION: 0
PALPITATIONS: 0
BACK PAIN: 0
DIAPHORESIS: 0
SPEECH CHANGE: 0

## 2023-06-25 NOTE — PROGRESS NOTES
Received patient resting in bed. NS infusing @ 50 ml/hr. PIV x2 to left arm patent with dressing intact.  Rt arm dressing from hand to  mid upper arm intact and changed per ortho today. Bed in low position, wheels locked, side rails up x2 and call light within reach.

## 2023-06-25 NOTE — PROGRESS NOTES
Hospital Medicine Daily Progress Note    Date of Service  6/25/2023    Chief Complaint  Vishal Parrish is a 52 y.o. male admitted 6/22/2023 with right hand cellulitis and abscess.    Hospital Course  Admitted with right hand cellulitis and abscess.  Patient was started on empiric coverage with IV vancomycin and Unasyn.  Orthopedic surgery was consulted on the case.  He underwent Incision and drainage right hand abscess, right carpal tunnel release on 6/23/2023.    Interval Problem Update  Hand cellulitis/abscess - pain controlled, MRSA PCR negative, cultures show Streptococcus  Low phosphorus and magnesium    I have discussed this patient's plan of care and discharge plan at IDT rounds today with Case Management, Nursing, Nursing leadership, and other members of the IDT team.    Consultants/Specialty  infectious disease and orthopedics    Code Status  Full Code    Disposition  The patient is not medically cleared for discharge to home or a post-acute facility.  Anticipate discharge to: home with close outpatient follow-up    I have placed the appropriate orders for post-discharge needs.    Review of Systems  Review of Systems   Constitutional:  Negative for chills, diaphoresis, fever and malaise/fatigue.   HENT:  Negative for congestion, hearing loss and sore throat.    Eyes:  Negative for blurred vision.   Respiratory:  Negative for cough, hemoptysis, shortness of breath and wheezing.    Cardiovascular:  Negative for chest pain, palpitations and leg swelling.   Gastrointestinal:  Negative for abdominal pain, diarrhea, heartburn, nausea and vomiting.   Genitourinary:  Negative for dysuria, flank pain and hematuria.   Musculoskeletal:  Positive for joint pain. Negative for back pain, myalgias and neck pain.   Skin:  Negative for rash.   Neurological:  Positive for weakness. Negative for dizziness, sensory change, speech change, focal weakness and headaches.   Psychiatric/Behavioral:  The patient is not  nervous/anxious.         Physical Exam  Temp:  [36.4 °C (97.5 °F)-37.2 °C (99 °F)] 37.2 °C (99 °F)  Pulse:  [78-96] 78  Resp:  [16-18] 18  BP: (143-156)/(96-98) 143/98  SpO2:  [91 %-94 %] 94 %    Physical Exam  Vitals and nursing note reviewed.   HENT:      Head: Normocephalic and atraumatic.      Nose: No congestion.      Mouth/Throat:      Mouth: Mucous membranes are moist.   Eyes:      Extraocular Movements: Extraocular movements intact.      Conjunctiva/sclera: Conjunctivae normal.   Cardiovascular:      Rate and Rhythm: Normal rate and regular rhythm.   Pulmonary:      Effort: Pulmonary effort is normal.      Breath sounds: Normal breath sounds.   Abdominal:      General: There is no distension.      Tenderness: There is no abdominal tenderness. There is no guarding or rebound.   Musculoskeletal:         General: Swelling (right hand) present.      Cervical back: No tenderness.   Skin:     General: Skin is warm and dry.   Neurological:      General: No focal deficit present.      Mental Status: He is alert and oriented to person, place, and time.      Cranial Nerves: No cranial nerve deficit.         Fluids    Intake/Output Summary (Last 24 hours) at 6/25/2023 1056  Last data filed at 6/25/2023 0838  Gross per 24 hour   Intake 360 ml   Output 4300 ml   Net -3940 ml         Laboratory  Recent Labs     06/23/23  0110 06/24/23  0238 06/25/23  0359   WBC 26.9* 24.7* 14.1*   RBC 4.36* 4.14* 4.12*   HEMOGLOBIN 13.3* 12.7* 12.7*   HEMATOCRIT 39.0* 37.8* 37.8*   MCV 89.4 91.3 91.7   MCH 30.5 30.7 30.8   MCHC 34.1 33.6 33.6   RDW 42.5 45.3 45.0   PLATELETCT 191 199 195   MPV 10.7 10.8 10.6       Recent Labs     06/23/23  0110 06/24/23  0238 06/25/23  0359   SODIUM 134* 137 135   POTASSIUM 3.8 4.3 3.9   CHLORIDE 96 104 99   CO2 23 23 23   GLUCOSE 127* 121* 114*   BUN 10 13 11   CREATININE 1.04 1.02 1.01   CALCIUM 9.3 8.9 8.8                     Imaging  CT-EXTREMITY, UPPER WITH RIGHT   Final Result      1.  There is a  focal enhancing fluid collection adjacent to the distal ulna and pisiform bone which could be a small soft tissue abscess.   2.  There is diffuse subcutaneous and intramuscular edema.   3.  Subchondral cystic changes in the carpal bones with possible small erosion on the ulnar side of the lunate and at the base of the 1st proximal phalanx. Possibility of an erosive arthropathy or possible infectious etiology could be considered.      DX-WRIST-LIMITED 2- RIGHT   Final Result         No acute osseous abnormality.             Assessment/Plan  * Cellulitis and abscess of hand- (present on admission)  Assessment & Plan  Incision and drainage right hand abscess, right carpal tunnel release   6/23/2023  IV Rocpehin  Follow cultures  Pain control, wound care  NWB RUE  ID consulted    Hypophosphatemia- (present on admission)  Assessment & Plan  Start Kphos  Follow level    Hypomagnesemia- (present on admission)  Assessment & Plan  IV Mg 2 g  Follow level    Elevated BP without diagnosis of hypertension- (present on admission)  Assessment & Plan  IV Labetalol as needed with parameters    Hyperglycemia- (present on admission)  Assessment & Plan  hgba1c 5.6    Hypokalemia- (present on admission)  Assessment & Plan  Follow bmp         VTE prophylaxis: enoxaparin ppx    I have performed a physical exam and reviewed and updated ROS and Plan today (6/25/2023). In review of yesterday's note (6/24/2023), there are no changes except as documented above.

## 2023-06-25 NOTE — PROGRESS NOTES
"      Orthopaedic Progress Note    Interval changes:  Patient doing well    R hand splint/ dressings are CDI  Cultures + Beta strep GC- ID team following     ROS - Patient denies any new issues.  Pain well controlled.    BP (!) 143/98   Pulse 78   Temp 37.2 °C (99 °F) (Temporal)   Resp 18   Ht 1.88 m (6' 2\")   Wt 91 kg (200 lb 9.9 oz)   SpO2 94%     Patient seen and examined  No acute distress  Breathing non labored  RRR  RUE in short arm splint CDI, DNVI, moves all fingers, cap refill <2 sec.     Recent Labs     06/23/23  0110 06/24/23  0238 06/25/23  0359   WBC 26.9* 24.7* 14.1*   RBC 4.36* 4.14* 4.12*   HEMOGLOBIN 13.3* 12.7* 12.7*   HEMATOCRIT 39.0* 37.8* 37.8*   MCV 89.4 91.3 91.7   MCH 30.5 30.7 30.8   MCHC 34.1 33.6 33.6   RDW 42.5 45.3 45.0   PLATELETCT 191 199 195   MPV 10.7 10.8 10.6       Active Hospital Problems    Diagnosis     Hypomagnesemia [E83.42]     Hypophosphatemia [E83.39]     Hypokalemia [E87.6]     Hyperglycemia [R73.9]     Elevated BP without diagnosis of hypertension [R03.0]     Cellulitis and abscess of hand [L03.119, L02.519]        Assessment/Plan:  Patient doing well    R hand splint/ dressings are CDI  Cultures + Beta strep GC- ID team following   POD#2 S/P Incision and drainage right hand abscess, right carpal tunnel release  Wt bearing status - NWB RUE  Wound care/Drains - Dressings/splint to be left in place    Future Procedures - non planned   Lovenox: Start 6/23, Duration-until ambulatory > 150'  Sutures/Staples out- 14-21 days post operatively. Removal will completed by ortho mid levels only.  PT/OT-initiated  Antibiotics: rocephin 2g IV QD  DVT Prophylaxis- TEDS/SCDs/Foot pumps  Shields-not needed per ortho  Case Coordination for Discharge Planning - Disposition per therapy recs.    "

## 2023-06-25 NOTE — CARE PLAN
The patient is Stable - Low risk of patient condition declining or worsening    Shift Goals  Clinical Goals: pain control; ABX  Patient Goals: pain control; comfort  Family Goals: NA    Progress made toward(s) clinical / shift goals:       Patient is not progressing towards the following goals:

## 2023-06-25 NOTE — CONSULTS
Consults  INFECTIOUS DISEASES INPATIENT CONSULT NOTE     Date of Service: 6/25/2023    Consult Requested By: Perry Gudino M.D.    Reason for Consultation: Right hand cellulitis, tenosynovitis and abscess    History of Present Illness:   Vishal Parrish is a 52 y.o.  admitted 6/22/2023. Pt has a past medical history of right hand and wrist pain ongoing for 3 days.  Patient also with long history of drug abuse.  He was febrile to 100.9 on 6/24 and now improved.  CT on 6/22 with enhancing fluid collection adjacent to the distal ulna thought to represent small tissue abscess, edema.  The patient went to the OR on 6/23 for I&D of right hand abscess and right carpal tunnel release.    Review Of Systems:  Review of Systems   Constitutional:  Positive for malaise/fatigue. Negative for chills and fever.   HENT:  Negative for hearing loss.    Eyes:  Negative for blurred vision and double vision.   Respiratory:  Negative for cough, sputum production and shortness of breath.    Cardiovascular:  Negative for chest pain.   Gastrointestinal:  Negative for abdominal pain, constipation, diarrhea, nausea and vomiting.   Genitourinary:  Negative for dysuria.   Musculoskeletal:  Positive for joint pain and myalgias.   Skin:  Negative for rash.   Neurological:  Negative for weakness.   Psychiatric/Behavioral:  The patient is not nervous/anxious.        PMH:   Past Medical History:   Diagnosis Date    Asthma        PSH:  Past Surgical History:   Procedure Laterality Date    FINGER OR HAND INCISION AND DRAINAGE Right 6/23/2023    Procedure: INCISION AND DRAINAGE, HAND;  Surgeon: Lj Montgomery M.D.;  Location: SURGERY [facility];  Service: Orthopedics    CARPAL TUNNEL RELEASE Right 6/23/2023    Procedure: RELEASE, CARPAL TUNNEL;  Surgeon: Lj Montgomery M.D.;  Location: SURGERY [facility];  Service: Orthopedics       FAMILY HX:  Family History   Problem Relation Age of Onset    No Known Problems Mother     No Known  Problems Father      Reviewed family history. No pertinent family history.     SOCIAL HX:  Social History     Socioeconomic History    Marital status: Single     Spouse name: Not on file    Number of children: Not on file    Years of education: Not on file    Highest education level: Not on file   Occupational History    Not on file   Tobacco Use    Smoking status: Never    Smokeless tobacco: Never   Vaping Use    Vaping Use: Never used   Substance and Sexual Activity    Alcohol use: No    Drug use: No    Sexual activity: Not on file   Other Topics Concern    Not on file   Social History Narrative    Not on file     Social Determinants of Health     Financial Resource Strain: Not on file   Food Insecurity: Not on file   Transportation Needs: Not on file   Physical Activity: Not on file   Stress: Not on file   Social Connections: Not on file   Intimate Partner Violence: Not on file   Housing Stability: Not on file     Social History     Tobacco Use   Smoking Status Never   Smokeless Tobacco Never   Vaping Use    Vaping Use: Never used     Social History     Substance and Sexual Activity   Alcohol Use No       Allergies/Intolerances:  No Known Allergies    History reviewed with the patient and /or family member, chart & primary care team    Other Current Medications:    Current Facility-Administered Medications:     phosphorus (K-Phos-Neutral) per tablet 250 mg, 1 Tablet, Oral, BID, Perry Gudino M.D., 250 mg at 06/25/23 0826    cefTRIAXone (Rocephin) syringe 2,000 mg, 2,000 mg, Intravenous, Q24HRS, TIM Mcgrath-MELIZA, 2,000 mg at 06/25/23 0424    senna-docusate (PERICOLACE or SENOKOT S) 8.6-50 MG per tablet 2 Tablet, 2 Tablet, Oral, BID, 2 Tablet at 06/24/23 1612 **AND** polyethylene glycol/lytes (MIRALAX) PACKET 1 Packet, 1 Packet, Oral, QDAY PRN **AND** magnesium hydroxide (MILK OF MAGNESIA) suspension 30 mL, 30 mL, Oral, QDAY PRN **AND** bisacodyl (DULCOLAX) suppository 10 mg, 10 mg, Rectal, QDAY PRN,  "Jesus Bryan M.D.    enoxaparin (Lovenox) inj 40 mg, 40 mg, Subcutaneous, DAILY AT 1800, Jesus Bryan M.D., 40 mg at 23 1612    labetalol (NORMODYNE/TRANDATE) injection 10 mg, 10 mg, Intravenous, Q4HRS PRN, Jesus Bryan M.D.    ondansetron (ZOFRAN) syringe/vial injection 4 mg, 4 mg, Intravenous, Q4HRS PRN, Jesus Bryan M.D.    ondansetron (ZOFRAN ODT) dispertab 4 mg, 4 mg, Oral, Q4HRS PRN, Jesus Bryan M.D.    promethazine (PHENERGAN) tablet 12.5-25 mg, 12.5-25 mg, Oral, Q4HRS PRN, Jesus Bryan M.D.    promethazine (PHENERGAN) suppository 12.5-25 mg, 12.5-25 mg, Rectal, Q4HRS PRN, Jesus Bryan M.D.    prochlorperazine (COMPAZINE) injection 5-10 mg, 5-10 mg, Intravenous, Q4HRS PRN, Jesus Bryan M.D.    acetaminophen (Tylenol) tablet 650 mg, 650 mg, Oral, Q6HRS PRN, Jesus Bryan M.D., 650 mg at 23 0539    oxyCODONE immediate-release (ROXICODONE) tablet 2.5 mg, 2.5 mg, Oral, Q3HRS PRN, 2.5 mg at 23 9248 **OR** oxyCODONE immediate-release (ROXICODONE) tablet 5 mg, 5 mg, Oral, Q3HRS PRN, 5 mg at 23 9605 **OR** morphine 4 MG/ML injection 2 mg, 2 mg, Intravenous, Q3HRS PRN, Jesus Bryan M.D.  [unfilled]    Most Recent Vital Signs:  BP (!) 143/98 Comment: RN Notified  Pulse 78   Temp 37.2 °C (99 °F) (Temporal)   Resp 18   Ht 1.88 m (6' 2\")   Wt 91 kg (200 lb 9.9 oz)   SpO2 94%   BMI 25.76 kg/m²   Temp  Av.2 °C (98.9 °F)  Min: 36.4 °C (97.5 °F)  Max: 38.3 °C (100.9 °F)    Physical Exam:  Physical Exam  Constitutional:       Appearance: Normal appearance.   HENT:      Head: Normocephalic and atraumatic.      Right Ear: External ear normal.      Nose: Nose normal.      Mouth/Throat:      Mouth: Mucous membranes are moist.      Pharynx: Oropharynx is clear.   Eyes:      Extraocular Movements: Extraocular movements intact.      Conjunctiva/sclera: Conjunctivae normal.      Pupils: Pupils are equal, round, and reactive to light. "   Cardiovascular:      Rate and Rhythm: Normal rate and regular rhythm.      Heart sounds: Normal heart sounds.   Pulmonary:      Effort: Pulmonary effort is normal.      Breath sounds: Normal breath sounds.   Abdominal:      General: Abdomen is flat. Bowel sounds are normal.      Palpations: Abdomen is soft.   Musculoskeletal:      Cervical back: Normal range of motion and neck supple.      Comments: Right hand and wrist and surgical dressings, patient states there are drains below the dressings, clean, dry and intact   Skin:     General: Skin is warm and dry.   Neurological:      General: No focal deficit present.      Mental Status: He is alert and oriented to person, place, and time.   Psychiatric:         Mood and Affect: Mood normal.         Behavior: Behavior normal.           Pertinent Lab Results:  Recent Labs     06/23/23  0110 06/24/23  0238 06/25/23  0359   WBC 26.9* 24.7* 14.1*      Recent Labs     06/23/23  0110 06/24/23  0238 06/25/23  0359   HEMOGLOBIN 13.3* 12.7* 12.7*   HEMATOCRIT 39.0* 37.8* 37.8*   MCV 89.4 91.3 91.7   MCH 30.5 30.7 30.8   PLATELETCT 191 199 195         Recent Labs     06/23/23  0110 06/24/23  0238 06/25/23  0359   SODIUM 134* 137 135   POTASSIUM 3.8 4.3 3.9   CHLORIDE 96 104 99   CO2 23 23 23   CREATININE 1.04 1.02 1.01        Recent Labs     06/22/23  1847 06/23/23  0110   ALBUMIN 4.6 3.9        Pertinent Micro:  Results       Procedure Component Value Units Date/Time    CULTURE WOUND W/ GRAM STAIN [[id number]]  (Abnormal) Collected: 06/23/23 0800    Order Status: Completed Specimen: Wound Updated: 06/25/23 0842     Significant Indicator POS     Source WND     Site Right Carpal Tunnel Incision     Culture Result -     Gram Stain Result Moderate WBCs.  No organisms seen.       Culture Result Beta Streptococcus Group C  Light growth      Anaerobic Culture [[id number]] Collected: 06/23/23 0800    Order Status: Completed Specimen: Wound Updated: 06/25/23 0842     Significant  "Indicator NEG     Source WND     Site Right Carpal Tunnel Incision     Culture Result Culture in progress.    MRSA By PCR (Amp) [[id number]] Collected: 06/24/23 0842    Order Status: Completed Specimen: Respirate from Nares Updated: 06/24/23 1352     MRSA by PCR Negative    Narrative:      Collected By: [id number] LORENE HAIDER    GRAM STAIN [[id number]] Collected: 06/23/23 0800    Order Status: Completed Specimen: Wound Updated: 06/23/23 2106     Significant Indicator .     Source WND     Site Right Carpal Tunnel Incision     Gram Stain Result Moderate WBCs.  No organisms seen.      Blood Culture [[id number]] Collected: 06/22/23 1921    Order Status: Completed Specimen: Blood from Peripheral Updated: 06/23/23 0841     Significant Indicator NEG     Source BLD     Site PERIPHERAL     Culture Result No Growth  Note: Blood cultures are incubated for 5 days and  are monitored continuously.Positive blood cultures  are called to the RN and reported as soon as  they are identified.      Narrative:      1 of 2 for Blood Culture x 2 sites order. Per Hospital  Policy: Only change Specimen Src: to \"Line\" if specified by  physician order.  Left Hand    Blood Culture [[id number]] Collected: 06/22/23 1847    Order Status: Completed Specimen: Blood from Peripheral Updated: 06/23/23 0841     Significant Indicator NEG     Source BLD     Site PERIPHERAL     Culture Result No Growth  Note: Blood cultures are incubated for 5 days and  are monitored continuously.Positive blood cultures  are called to the RN and reported as soon as  they are identified.      Narrative:      2 of 2 blood culture x2  Sites order. Per Hospital Policy:  Only change Specimen Src: to \"Line\" if specified by physician  order.  No site indicated    MRSA By PCR (Amp) [[id number]]     Order Status: Canceled Specimen: Respirate from Nares           Blood Culture   Date Value Ref Range Status   01/29/2018 No growth after 5 days of incubation.  Final    "     Studies:  CT-EXTREMITY, UPPER WITH RIGHT    Result Date: 6/22/2023 6/22/2023 8:23 PM HISTORY/REASON FOR EXAM: Right hand swelling and arm swelling with pain. TECHNIQUE/EXAM DESCRIPTION AND NUMBER OF VIEWS: CT scan of the RIGHT upper extremity. Axial spiral CT images were obtained of the RIGHT upper extremity from the mid forearm through the hand. Images were reviewed at soft tissue and bone windows with administration of 100 mL of Omnipaque 350 without complication. Up to date radiation dose reduction adjustments have been utilized to meet ALARA standards for radiation dose reduction. COMPARISON: X-ray of the hand and wrist earlier 6/22/2023 FINDINGS: There is mild diffuse subcutaneous edema with some intramuscular fluid and fluid in the carpal tunnel. Small focal fluid collection seen adjacent to the distal ulna and just dorsal to the proximal pisiform bone with rim enhancement measuring 12 x 8 x 10 mm. The vascular structures demonstrate normal enhancement. Bones demonstrate a few subchondral cystic changes in the carpal bones with a questionable small erosion on the ulnar side of the lunate. Also questionable erosion at the base of the 1st proximal phalanx.aaaaaaa No periostitis. No acute fractures.     1.  There is a focal enhancing fluid collection adjacent to the distal ulna and pisiform bone which could be a small soft tissue abscess. 2.  There is diffuse subcutaneous and intramuscular edema. 3.  Subchondral cystic changes in the carpal bones with possible small erosion on the ulnar side of the lunate and at the base of the 1st proximal phalanx. Possibility of an erosive arthropathy or possible infectious etiology could be considered.    DX-WRIST-LIMITED 2- RIGHT    Result Date: 6/22/2023 6/22/2023 6:46 PM HISTORY/REASON FOR EXAM:  Atraumatic Pain/Swelling/Deformity TECHNIQUE/EXAM DESCRIPTION AND NUMBER OF VIEWS:  2 views of the RIGHT wrist. COMPARISON: None FINDINGS: Soft tissue swelling about the  dorsum of the wrist. No acute fracture or dislocation. The carpal rows appear intact. Mild osteoarthritis.     No acute osseous abnormality.    DX-HAND 3+ RIGHT    Result Date: 6/22/2023 6/22/2023 8:35 AM HISTORY/REASON FOR EXAM:  Pain/Deformity Following Trauma. TECHNIQUE/EXAM DESCRIPTION AND NUMBER OF VIEWS:  3 views of the  RIGHT hand. COMPARISON: None FINDINGS: MINERALIZATION: Mineralization is unremarkable for age. INJURY: No acute fracture or gross malalignment is seen. JOINTS: There is a small lucency along the medial base of the first proximal phalanx adjacent to but not clearly communicating with the joint.     Well-defined erosion at the base of the first proximal phalanx suspicious for gout no other erosive arthropathy can also have this appearance    DX-FOREARM RIGHT    Result Date: 6/22/2023 6/22/2023 8:35 AM HISTORY/REASON FOR EXAM: Atraumatic pain TECHNIQUE/EXAM DESCRIPTION AND NUMBER OF VIEWS:  2 views of the  RIGHT forearm. COMPARISON: None FINDINGS: MINERALIZATION: Mineralization is unremarkable for age. INJURY: No acute fracture or gross malalignment is seen. JOINTS: No gross abnormality. Small olecranon enthesophyte.     No radiographic evidence of acute traumatic injury or bony erosion.    DX-WRIST-LIMITED 2- RIGHT    Result Date: 6/22/2023 6/22/2023 8:36 AM HISTORY/REASON FOR EXAM:  Pain/Deformity Following Trauma. TECHNIQUE/EXAM DESCRIPTION AND NUMBER OF VIEWS:  2 views of the  RIGHT wrist. COMPARISON: None FINDINGS: MINERALIZATION: Mineralization is unremarkable for age. INJURY: No acute fracture or gross malalignment is seen. JOINTS: No erosive arthropathy is evident.     No radiographic evidence of acute traumatic injury or bony erosion.      ASSESSMENT/PLAN:     52 y.o.  admitted 6/22/2023. Pt has a past medical history of right hand and wrist pain ongoing for 3 days.  Patient also endorses history of drug use, denies any IV drug use and states he has not used in ~ year.  In the ED we  was febrile to 100.9 on 6/24 and now improved.  CT on 6/22 with enhancing fluid collection adjacent to the distal ulna thought to represent small tissue abscess, edema.  The patient went to the OR on 6/23 for I&D of right hand abscess and right carpal tunnel release.    Problem List    Sepsis, improved  Fevers, improved  Leukocytosis, ongoing  Right hand cellulitis, tenosynovitis and abscess  -Status post I&D on 6/23 with carpal tunnel release,, tenosynovitis and purulent fluid encountered which was sent for culture.  -OR cultures + group C Streptococcus   Drug abuse history, no use for 1 year per pt   -Patient denies any history of IV drug use    ssessment:      -Notes were reviewed from primary team, radiology, surgery, nursing etc.  -Reviewed labs to date, microbiology for current admit and prior  -Imaging was independently reviewed and interpreted    Plan:    Recommendations for antibiotics:   -Continue ceftriaxone 1 g every 24 hours while inpatient, on discharge okay to transition to Augmentin 875/25 twice daily and give another 4-week course  Recommendations for further/ongoing work-up:   -Monitor labs, CBC and CMP  -Wound care dressings per surgery  -Drain management per surgery  -Follow-up with surgery    Follow-up in ID clinic in the next 2 to 3 weeks, okay to be seen by NP Shell    Dispo: Awaiting surgical clearance.      PICC: No      Plan of care discussed with ZEESHAN Gudino M.D. ID will sign off, if any concerns for ongoing infection at the surgical site or additional procedures please reconsult ID.    Melissa Crabtree M.D.

## 2023-06-25 NOTE — PROGRESS NOTES
AA&Ox4. Denies CP/SOB.  Reporting 5/10 pain. Medicated per MAR.   Educated patient regarding pharmacologic and non pharmacologic modalities for pain management.  Skin per flowsheet.  Tolerating regular diet. Denies N/V.  + void. Last BM PTA.  Pt ambulates Sb assist.  All needs met at this time. Call light within reach. Pt calls appropriately. Bed low and locked, non skid socks in place. Hourly rounding in place.

## 2023-06-25 NOTE — CARE PLAN
The patient is Stable - Low risk of patient condition declining or worsening    Instructed on plan of care, meds. Pain controlled with current pain regimen.    Problem: Pain - Standard  Goal: Alleviation of pain or a reduction in pain to the patient’s comfort goal  Outcome: Progressing     Problem: Knowledge Deficit - Standard  Goal: Patient and family/care givers will demonstrate understanding of plan of care, disease process/condition, diagnostic tests and medications  Outcome: Progressing     Shift Goals  Clinical Goals: Pain management, IV  Patient Goals: rest/comfort  Family Goals: NA    Progress made toward(s) clinical / shift goals:  progressing    Patient is not progressing towards the following goals:

## 2023-06-26 ENCOUNTER — PHARMACY VISIT (OUTPATIENT)
Dept: PHARMACY | Facility: MEDICAL CENTER | Age: 53
End: 2023-06-26
Payer: COMMERCIAL

## 2023-06-26 VITALS
HEART RATE: 93 BPM | HEIGHT: 74 IN | OXYGEN SATURATION: 97 % | RESPIRATION RATE: 18 BRPM | WEIGHT: 200.62 LBS | TEMPERATURE: 97.7 F | BODY MASS INDEX: 25.75 KG/M2 | DIASTOLIC BLOOD PRESSURE: 102 MMHG | SYSTOLIC BLOOD PRESSURE: 157 MMHG

## 2023-06-26 LAB
ANION GAP SERPL CALC-SCNC: 11 MMOL/L (ref 7–16)
BACTERIA SPEC ANAEROBE CULT: NORMAL
BUN SERPL-MCNC: 9 MG/DL (ref 8–22)
CALCIUM SERPL-MCNC: 9.2 MG/DL (ref 8.5–10.5)
CHLORIDE SERPL-SCNC: 99 MMOL/L (ref 96–112)
CO2 SERPL-SCNC: 23 MMOL/L (ref 20–33)
CREAT SERPL-MCNC: 0.77 MG/DL (ref 0.5–1.4)
ERYTHROCYTE [DISTWIDTH] IN BLOOD BY AUTOMATED COUNT: 42.6 FL (ref 35.9–50)
GFR SERPLBLD CREATININE-BSD FMLA CKD-EPI: 108 ML/MIN/1.73 M 2
GLUCOSE SERPL-MCNC: 116 MG/DL (ref 65–99)
HCT VFR BLD AUTO: 41 % (ref 42–52)
HGB BLD-MCNC: 14.2 G/DL (ref 14–18)
MAGNESIUM SERPL-MCNC: 1.9 MG/DL (ref 1.5–2.5)
MCH RBC QN AUTO: 30.7 PG (ref 27–33)
MCHC RBC AUTO-ENTMCNC: 34.6 G/DL (ref 32.3–36.5)
MCV RBC AUTO: 88.7 FL (ref 81.4–97.8)
PHOSPHATE SERPL-MCNC: 3.2 MG/DL (ref 2.5–4.5)
PLATELET # BLD AUTO: 246 K/UL (ref 164–446)
PMV BLD AUTO: 10.4 FL (ref 9–12.9)
POTASSIUM SERPL-SCNC: 3.7 MMOL/L (ref 3.6–5.5)
RBC # BLD AUTO: 4.62 M/UL (ref 4.7–6.1)
SIGNIFICANT IND 70042: NORMAL
SITE SITE: NORMAL
SODIUM SERPL-SCNC: 133 MMOL/L (ref 135–145)
SOURCE SOURCE: NORMAL
WBC # BLD AUTO: 11.2 K/UL (ref 4.8–10.8)

## 2023-06-26 PROCEDURE — A9270 NON-COVERED ITEM OR SERVICE: HCPCS | Performed by: INTERNAL MEDICINE

## 2023-06-26 PROCEDURE — 700102 HCHG RX REV CODE 250 W/ 637 OVERRIDE(OP)

## 2023-06-26 PROCEDURE — A9270 NON-COVERED ITEM OR SERVICE: HCPCS

## 2023-06-26 PROCEDURE — 85027 COMPLETE CBC AUTOMATED: CPT

## 2023-06-26 PROCEDURE — 97165 OT EVAL LOW COMPLEX 30 MIN: CPT

## 2023-06-26 PROCEDURE — 99239 HOSP IP/OBS DSCHRG MGMT >30: CPT | Performed by: FAMILY MEDICINE

## 2023-06-26 PROCEDURE — RXMED WILLOW AMBULATORY MEDICATION CHARGE: Performed by: FAMILY MEDICINE

## 2023-06-26 PROCEDURE — 700111 HCHG RX REV CODE 636 W/ 250 OVERRIDE (IP): Performed by: PHYSICIAN ASSISTANT

## 2023-06-26 PROCEDURE — 83735 ASSAY OF MAGNESIUM: CPT

## 2023-06-26 PROCEDURE — 84100 ASSAY OF PHOSPHORUS: CPT

## 2023-06-26 PROCEDURE — 700102 HCHG RX REV CODE 250 W/ 637 OVERRIDE(OP): Performed by: INTERNAL MEDICINE

## 2023-06-26 PROCEDURE — 80048 BASIC METABOLIC PNL TOTAL CA: CPT

## 2023-06-26 RX ORDER — AMOXICILLIN AND CLAVULANATE POTASSIUM 875; 125 MG/1; MG/1
1 TABLET, FILM COATED ORAL 2 TIMES DAILY
Qty: 56 TABLET | Refills: 0 | Status: ACTIVE | OUTPATIENT
Start: 2023-06-26 | End: 2023-07-24

## 2023-06-26 RX ORDER — DIPHENHYDRAMINE HCL 25 MG
25 TABLET ORAL ONCE
Status: COMPLETED | OUTPATIENT
Start: 2023-06-26 | End: 2023-06-26

## 2023-06-26 RX ORDER — OXYCODONE HYDROCHLORIDE 5 MG/1
5 TABLET ORAL EVERY 6 HOURS PRN
Qty: 20 TABLET | Refills: 0 | Status: SHIPPED | OUTPATIENT
Start: 2023-06-26 | End: 2023-07-01

## 2023-06-26 RX ADMIN — DIPHENHYDRAMINE HYDROCHLORIDE 25 MG: 25 TABLET ORAL at 02:06

## 2023-06-26 RX ADMIN — OXYCODONE HYDROCHLORIDE 2.5 MG: 5 TABLET ORAL at 02:06

## 2023-06-26 RX ADMIN — CEFTRIAXONE SODIUM 2000 MG: 10 INJECTION, POWDER, FOR SOLUTION INTRAVENOUS at 06:21

## 2023-06-26 ASSESSMENT — COGNITIVE AND FUNCTIONAL STATUS - GENERAL
HELP NEEDED FOR BATHING: A LITTLE
DAILY ACTIVITIY SCORE: 21
TOILETING: A LITTLE
DRESSING REGULAR LOWER BODY CLOTHING: A LITTLE
SUGGESTED CMS G CODE MODIFIER DAILY ACTIVITY: CJ

## 2023-06-26 ASSESSMENT — PAIN DESCRIPTION - PAIN TYPE
TYPE: ACUTE PAIN
TYPE: ACUTE PAIN
TYPE: ACUTE PAIN;SURGICAL PAIN

## 2023-06-26 ASSESSMENT — ACTIVITIES OF DAILY LIVING (ADL): TOILETING: INDEPENDENT

## 2023-06-26 NOTE — PROGRESS NOTES
DC instructions reviewed w/ pt, verbalized understanding. PIV dc'd, meds to bed delivered, armband removed.

## 2023-06-26 NOTE — THERAPY
Occupational Therapy   Initial Evaluation     Patient Name: Vishal Parrish  Age:  52 y.o., Sex:  male  Medical Record #: [id number]  Today's Date: 6/26/2023     Precautions  Precautions: Fall Risk, Non Weight Bearing Right Upper Extremity  Comments: bulky splint to R UE    Assessment  Patient is 52 y.o. male  s/p Incision and drainage right hand abscess, right carpal tunnel release.  Pt edu on compensatory strategies during ADLs as well as appropriate AE/DME to maximize independence and safety. He is at a supervision level for basic self care, functional mobility, and functional transfers. He denies any further deficits in self care at this time.    Plan    Occupational Therapy Initial Treatment Plan   Duration: Evaluation only    DC Equipment Recommendations: None  Discharge Recommendations: Recommend outpatient occupational therapy services to address higher level deficits        Objective       06/26/23 1057   Prior Living Situation   Prior Services Home-Independent   Bathroom Set up Walk In Shower   Equipment Owned None   Lives with - Patient's Self Care Capacity Alone and Able to Care For Self   Comments Pt reports he has good work friends that can help him with IADLs if needed   Prior Level of ADL Function   Self Feeding Independent   Grooming / Hygiene Independent   Bathing Independent   Dressing Independent   Toileting Independent   Prior Level of IADL Function   Medication Management Independent   Laundry Independent   Kitchen Mobility Independent   Finances Independent   Home Management Independent   Shopping Independent   Prior Level Of Mobility Independent Without Device in Community;Independent Without Device in Home   Driving / Transportation Driving Independent   Occupation (Pre-Hospital Vocational) Employed Full Time   Precautions   Precautions Fall Risk;Non Weight Bearing Right Upper Extremity   Comments bulky splint to R UE   Pain 0 - 10 Group   Therapist Pain Assessment Nurse Notified;During  Activity  (min c/o incisional pain)   Cognition    Cognition / Consciousness WDL   Level of Consciousness Alert   Comments pleasant and cooperative, [place]   Active ROM Upper Body   Active ROM Upper Body  X   Dominant Hand Left   Comments unable to test R UE distal due to bulky dressing/splint; proximal WFL; LUE WFL   Strength Upper Body   Comments see above   Balance Assessment   Sitting Balance (Static) Fair   Sitting Balance (Dynamic) Fair   Standing Balance (Static) Fair   Standing Balance (Dynamic) Fair -   Weight Shift Sitting Fair   Weight Shift Standing Fair   Comments no AD, some mild LOB upon standing but able to self correct   Bed Mobility    Supine to Sit Supervised   Scooting Supervised   ADL Assessment   Grooming Supervision;Standing   Upper Body Dressing Supervision   Lower Body Dressing Supervision   Toileting Supervision   Comments full body dressing; edu on compensatory strategies   How much help from another person does the patient currently need...   Putting on and taking off regular lower body clothing? 3   Bathing (including washing, rinsing, and drying)? 3   Toileting, which includes using a toilet, bedpan, or urinal? 3   Putting on and taking off regular upper body clothing? 4   Taking care of personal grooming such as brushing teeth? 4   Eating meals? 4   6 Clicks Daily Activity Score 21   Functional Mobility   Sit to Stand Supervised   Bed, Chair, Wheelchair Transfer Supervised   Toilet Transfers Supervised   Mobility EOB>BR>BTB   Comments no AD

## 2023-06-26 NOTE — DISCHARGE INSTRUCTIONS
Abscess  Care After  An abscess (also called a boil or furuncle) is an infected area that contains a collection of pus. Signs and symptoms of an abscess include pain, tenderness, redness, or hardness, or you may feel a moveable soft area under your skin. An abscess can occur anywhere in the body. The infection may spread to surrounding tissues causing cellulitis. A cut (incision) by the surgeon was made over your abscess and the pus was drained out. Gauze may have been packed into the space to provide a drain that will allow the cavity to heal from the inside outwards. The boil may be painful for 5 to 7 days. Most people with a boil do not have high fevers. Your abscess, if seen early, may not have localized, and may not have been lanced. If not, another appointment may be required for this if it does not get better on its own or with medications.  HOME CARE INSTRUCTIONS   Only take over-the-counter or prescription medicines for pain, discomfort, or fever as directed by your caregiver.  When you bathe, soak and then remove gauze or iodoform packs at least daily or as directed by your caregiver. You may then wash the wound gently with mild soapy water. Repack with gauze or do as your caregiver directs.  SEEK IMMEDIATE MEDICAL CARE IF:   You develop increased pain, swelling, redness, drainage, or bleeding in the wound site.  You develop signs of generalized infection including muscle aches, chills, fever, or a general ill feeling.  An oral temperature above 102° F (38.9° C) develops, not controlled by medication.  See your caregiver for a recheck if you develop any of the symptoms described above. If medications (antibiotics) were prescribed, take them as directed.  Document Released: 07/06/2006 Document Revised: 03/11/2013 Document Reviewed: 03/02/2009  [employer]® Patient Information ©2014 [employer].

## 2023-06-26 NOTE — DISCHARGE SUMMARY
Discharge Summary    CHIEF COMPLAINT ON ADMISSION  Chief Complaint   Patient presents with    Hand Swelling     RIGHT  10/10 burning pain  Swelling extend to wrist       Reason for Admission  Wrist pain     Admission Date  6/22/2023    CODE STATUS  Full Code    HPI & HOSPITAL COURSE  This is a 52 y.o. male here with Right hand cellulitis and abscess.  Patient was started on empiric coverage with IV Vancomycin and Unasyn.  Orthopedic surgery was consulted on the case.  He underwent Incision and drainage right hand abscess, right carpal tunnel release on 6/23/2023.  Cultures showed Streptococcus.  Infectious disease was consulted on the case.  Antibiotics were changed to IV Rocephin.  Orthopedic surgery has cleared the patient for discharge.  Infectious disease has recommended Augmentin twice daily for 4 more weeks.  He will also need to have close follow-up with orthopedic surgery and infectious disease as outpatient.      Therefore, he is discharged in good and stable condition to home with close outpatient follow-up.    The patient met 2-midnight criteria for an inpatient stay at the time of discharge.    Discharge Date  6/26/2023    FOLLOW UP ITEMS POST DISCHARGE  Follow up as below    DISCHARGE DIAGNOSES  Principal Problem:    Cellulitis and abscess of hand (POA: Yes)  Active Problems:    Hypokalemia (POA: Yes)    Hyperglycemia (POA: Yes)    Elevated BP without diagnosis of hypertension (POA: Yes)    Hypomagnesemia (POA: Yes)    Hypophosphatemia (POA: Yes)  Resolved Problems:    * No resolved hospital problems. *      FOLLOW UP  No future appointments.  Lj Montgomery M.D.  [address] [facility] [id number] [phone]    Follow up      [facility]  [address] [place], [place] [address] [facility] [id number]  [facility] [address]  [phone]  Follow up        MEDICATIONS ON DISCHARGE     Medication List        START taking these medications        Instructions   amoxicillin-clavulanate 875-125 MG  Tabs  Commonly known as: AUGMENTIN   Take 1 Tablet by mouth 2 times a day for 28 days.  Dose: 1 Tablet     oxyCODONE immediate-release 5 MG Tabs  Commonly known as: ROXICODONE   Take 1 Tablet by mouth every 6 hours as needed for Severe Pain for up to 5 days.  Dose: 5 mg              Allergies  No Known Allergies    DIET  Orders Placed This Encounter   Procedures    Diet Order Diet: Regular     Standing Status:   Standing     Number of Occurrences:   1     Order Specific Question:   Diet:     Answer:   Regular [1]       ACTIVITY  As tolerated.  0-lb weight restriction RIGHT arm    CONSULTATIONS  Orthopedic surgery - [facility]    PROCEDURES  Incision and drainage right hand abscess, right carpal tunnel release on 6/23/2023.     LABORATORY  Lab Results   Component Value Date    SODIUM 133 (L) 06/26/2023    POTASSIUM 3.7 06/26/2023    CHLORIDE 99 06/26/2023    CO2 23 06/26/2023    GLUCOSE 116 (H) 06/26/2023    BUN 9 06/26/2023    CREATININE 0.77 06/26/2023        Lab Results   Component Value Date    WBC 11.2 (H) 06/26/2023    HEMOGLOBIN 14.2 06/26/2023    HEMATOCRIT 41.0 (L) 06/26/2023    PLATELETCT 246 06/26/2023        Total time of the discharge process exceeds 35 minutes.

## 2023-06-26 NOTE — PROGRESS NOTES
"      Orthopaedic Progress Note    Interval changes:  Patient doing well    Penrose drains x2 removed   Cleared for DC by ortho pending medicine clearance     ROS - Patient denies any new issues.  Pain well controlled.    BP (!) 157/102   Pulse 93   Temp 36.5 °C (97.7 °F) (Temporal)   Resp 18   Ht 1.88 m (6' 2\")   Wt 91 kg (200 lb 9.9 oz)   SpO2 97%     Patient seen and examined  No acute distress  Breathing non labored  RRR  RUE in short arm splint CDI, DNVI, moves all fingers, cap refill <2 sec.     Recent Labs     06/24/23  0238 06/25/23  0359 06/26/23  0228   WBC 24.7* 14.1* 11.2*   RBC 4.14* 4.12* 4.62*   HEMOGLOBIN 12.7* 12.7* 14.2   HEMATOCRIT 37.8* 37.8* 41.0*   MCV 91.3 91.7 88.7   MCH 30.7 30.8 30.7   MCHC 33.6 33.6 34.6   RDW 45.3 45.0 42.6   PLATELETCT 199 195 246   MPV 10.8 10.6 10.4       Active Hospital Problems    Diagnosis     Hypomagnesemia [E83.42]     Hypophosphatemia [E83.39]     Hypokalemia [E87.6]     Hyperglycemia [R73.9]     Elevated BP without diagnosis of hypertension [R03.0]     Cellulitis and abscess of hand [L03.119, L02.519]        Assessment/Plan:  Patient doing well    Penrose drains x2 removed   Cleared for DC by ortho pending medicine clearance   POD#3 S/P Incision and drainage right hand abscess, right carpal tunnel release  Wt bearing status - NWB RUE  Wound care/Drains - Dressings/splint to be left in place    Future Procedures - non planned   Lovenox: Start 6/23, Duration-until ambulatory > 150'  Sutures/Staples out- 14-21 days post operatively. Removal will completed by ortho mid levels only.  PT/OT-initiated  Antibiotics: rocephin 2g IV QD  DVT Prophylaxis- TEDS/SCDs/Foot pumps  Shields-not needed per ortho  Case Coordination for Discharge Planning - Disposition per therapy recs.    "

## 2023-07-07 ENCOUNTER — TELEPHONE (OUTPATIENT)
Dept: INFECTIOUS DISEASES | Facility: MEDICAL CENTER | Age: 53
End: 2023-07-07
Payer: COMMERCIAL

## 2023-07-07 NOTE — TELEPHONE ENCOUNTER
LVM for patient to return my call to schedule HFV appt prior to EOT 7/24   Patient information on fall and injury prevention

## 2023-07-13 ASSESSMENT — ENCOUNTER SYMPTOMS
MYALGIAS: 0
NAUSEA: 0
SHORTNESS OF BREATH: 0
WHEEZING: 0
WEIGHT LOSS: 0
SPUTUM PRODUCTION: 0
VOMITING: 0
DOUBLE VISION: 0
FOCAL WEAKNESS: 0
PALPITATIONS: 0
COUGH: 0
CHILLS: 0
DIZZINESS: 0
BRUISES/BLEEDS EASILY: 0
HEADACHES: 0
ABDOMINAL PAIN: 0
BLURRED VISION: 0
FEVER: 0

## 2023-07-14 ENCOUNTER — HOSPITAL ENCOUNTER (OUTPATIENT)
Dept: LAB | Facility: MEDICAL CENTER | Age: 53
End: 2023-07-14
Attending: NURSE PRACTITIONER
Payer: COMMERCIAL

## 2023-07-14 ENCOUNTER — OFFICE VISIT (OUTPATIENT)
Dept: INFECTIOUS DISEASES | Facility: MEDICAL CENTER | Age: 53
End: 2023-07-14
Attending: NURSE PRACTITIONER
Payer: COMMERCIAL

## 2023-07-14 VITALS
WEIGHT: 188.49 LBS | SYSTOLIC BLOOD PRESSURE: 130 MMHG | HEIGHT: 74 IN | DIASTOLIC BLOOD PRESSURE: 90 MMHG | HEART RATE: 110 BPM | BODY MASS INDEX: 24.19 KG/M2 | RESPIRATION RATE: 16 BRPM | TEMPERATURE: 96.4 F | OXYGEN SATURATION: 96 %

## 2023-07-14 DIAGNOSIS — M65.9 TENOSYNOVITIS OF HAND: ICD-10-CM

## 2023-07-14 DIAGNOSIS — L03.119 CELLULITIS AND ABSCESS OF HAND: ICD-10-CM

## 2023-07-14 DIAGNOSIS — L02.519 CELLULITIS AND ABSCESS OF HAND: ICD-10-CM

## 2023-07-14 DIAGNOSIS — A49.1 BACTERIAL INFECTION DUE TO STREPTOCOCCUS, GROUP C: ICD-10-CM

## 2023-07-14 DIAGNOSIS — F19.11 HISTORY OF DRUG ABUSE (HCC): ICD-10-CM

## 2023-07-14 DIAGNOSIS — R00.0 TACHYCARDIA: ICD-10-CM

## 2023-07-14 LAB
ALBUMIN SERPL BCP-MCNC: 4.3 G/DL (ref 3.2–4.9)
ALBUMIN/GLOB SERPL: 1.3 G/DL
ALP SERPL-CCNC: 87 U/L (ref 30–99)
ALT SERPL-CCNC: 12 U/L (ref 2–50)
ANION GAP SERPL CALC-SCNC: 16 MMOL/L (ref 7–16)
AST SERPL-CCNC: 12 U/L (ref 12–45)
BASOPHILS # BLD AUTO: 1.8 % (ref 0–1.8)
BASOPHILS # BLD: 0.12 K/UL (ref 0–0.12)
BILIRUB SERPL-MCNC: 0.3 MG/DL (ref 0.1–1.5)
BUN SERPL-MCNC: 11 MG/DL (ref 8–22)
CALCIUM ALBUM COR SERPL-MCNC: 9.2 MG/DL (ref 8.5–10.5)
CALCIUM SERPL-MCNC: 9.4 MG/DL (ref 8.5–10.5)
CHLORIDE SERPL-SCNC: 104 MMOL/L (ref 96–112)
CO2 SERPL-SCNC: 23 MMOL/L (ref 20–33)
CREAT SERPL-MCNC: 1.09 MG/DL (ref 0.5–1.4)
EOSINOPHIL # BLD AUTO: 0.29 K/UL (ref 0–0.51)
EOSINOPHIL NFR BLD: 4.2 % (ref 0–6.9)
ERYTHROCYTE [DISTWIDTH] IN BLOOD BY AUTOMATED COUNT: 43.7 FL (ref 35.9–50)
GFR SERPLBLD CREATININE-BSD FMLA CKD-EPI: 82 ML/MIN/1.73 M 2
GLOBULIN SER CALC-MCNC: 3.4 G/DL (ref 1.9–3.5)
GLUCOSE SERPL-MCNC: 76 MG/DL (ref 65–99)
HCT VFR BLD AUTO: 43.2 % (ref 42–52)
HGB BLD-MCNC: 14.4 G/DL (ref 14–18)
IMM GRANULOCYTES # BLD AUTO: 0.02 K/UL (ref 0–0.11)
IMM GRANULOCYTES NFR BLD AUTO: 0.3 % (ref 0–0.9)
LYMPHOCYTES # BLD AUTO: 1.66 K/UL (ref 1–4.8)
LYMPHOCYTES NFR BLD: 24.3 % (ref 22–41)
MCH RBC QN AUTO: 30.1 PG (ref 27–33)
MCHC RBC AUTO-ENTMCNC: 33.3 G/DL (ref 32.3–36.5)
MCV RBC AUTO: 90.2 FL (ref 81.4–97.8)
MONOCYTES # BLD AUTO: 0.57 K/UL (ref 0–0.85)
MONOCYTES NFR BLD AUTO: 8.3 % (ref 0–13.4)
NEUTROPHILS # BLD AUTO: 4.17 K/UL (ref 1.82–7.42)
NEUTROPHILS NFR BLD: 61.1 % (ref 44–72)
NRBC # BLD AUTO: 0 K/UL
NRBC BLD-RTO: 0 /100 WBC (ref 0–0.2)
PLATELET # BLD AUTO: 493 K/UL (ref 164–446)
PMV BLD AUTO: 10.2 FL (ref 9–12.9)
POTASSIUM SERPL-SCNC: 4 MMOL/L (ref 3.6–5.5)
PROT SERPL-MCNC: 7.7 G/DL (ref 6–8.2)
RBC # BLD AUTO: 4.79 M/UL (ref 4.7–6.1)
SODIUM SERPL-SCNC: 143 MMOL/L (ref 135–145)
WBC # BLD AUTO: 6.8 K/UL (ref 4.8–10.8)

## 2023-07-14 PROCEDURE — 3080F DIAST BP >= 90 MM HG: CPT | Performed by: NURSE PRACTITIONER

## 2023-07-14 PROCEDURE — 99213 OFFICE O/P EST LOW 20 MIN: CPT | Performed by: NURSE PRACTITIONER

## 2023-07-14 PROCEDURE — 85025 COMPLETE CBC W/AUTO DIFF WBC: CPT

## 2023-07-14 PROCEDURE — 36415 COLL VENOUS BLD VENIPUNCTURE: CPT

## 2023-07-14 PROCEDURE — 3075F SYST BP GE 130 - 139MM HG: CPT | Performed by: NURSE PRACTITIONER

## 2023-07-14 PROCEDURE — 80053 COMPREHEN METABOLIC PANEL: CPT

## 2023-07-14 ASSESSMENT — FIBROSIS 4 INDEX: FIB4 SCORE: 1.18

## 2023-07-14 ASSESSMENT — ENCOUNTER SYMPTOMS: NERVOUS/ANXIOUS: 1

## 2023-07-14 NOTE — PROGRESS NOTES
INFECTIOUS  DISEASE  OUTPATIENT CLINIC  NOTE   Subjective   Primary care provider: Pcp Pt States None.     Reason for Follow Up:   Follow-up for   1. Cellulitis and abscess of hand  CBC WITH DIFFERENTIAL    Comp Metabolic Panel      2. Bacterial infection due to Streptococcus, group C        3. Tenosynovitis of hand        4. History of drug abuse (HCC)        5. Tachycardia            HPI: Patient previously seen and treated by ID team as inpatient during hospital admission.   Vishal Parrish is a 52 y.o.  admitted 6/22/2023. Pt has a past medical history of right hand and wrist pain ongoing for 3 days.  Patient also endorses history of drug use, denies any IV drug use and states he has not used in ~ year.  In the ED we was febrile to 100.9 on 6/24 and now improved.  CT on 6/22 with enhancing fluid collection adjacent to the distal ulna thought to represent small tissue abscess, edema.  The patient went to the OR on 6/23 for I&D of right hand abscess and right carpal tunnel release OP report: flexor tendons were significantly swollen with evidence of tenosynovitis and there was a lot of pressure in the carpal tunnel.  Upon release of the carpal tunnels purulent fluid began to drain out. OR cultures + group C Streptococcus. Plan at discharge was Po Augmentin 875/25 twice daily 4-week course, end date 7/24/23.     07/14/23- Today Patient reports feeling well. Pt stating that the wound is healing well.  Wound pictures reviewed in EPIC. Denies drainage, pungent odor, redness, pain. Denies feeling generally ill, fevers/chills, general malaise, headache, n/v/d.  Movement in his right hand has been slowly improving.  Tolerating p.o. Augmentin with no complaints of side effects.  Most recent labs reviewed from 6/26/2023, Leukos cytosis 11.2 but trending down, CMP mostly unremarkable besides slightly hyponatremia 133.  He is scheduled follow-up with orthopedic surgery next Friday for suture removal and wound eval. Patient  very nervous for  appointment today.  States he is usually nervous when going in for doctors appointments.     Current Antimicrobials: Po Augmentin 875/25 twice daily 4-week course, end date 7/24/23.   Previous Antimicrobials: ceftriaxone    Other Current Medications:  Home Medications    Medication Sig Taking? Last Dose Authorizing Provider   amoxicillin-clavulanate (AUGMENTIN) 875-125 MG Tab Take 1 Tablet by mouth 2 times a day for 28 days. Yes Taking Perry Gudino M.D.        PMH:  Past Medical History:   Diagnosis Date    Asthma      Past Surgical History:   Procedure Laterality Date    FINGER OR HAND INCISION AND DRAINAGE Right 6/23/2023    Procedure: INCISION AND DRAINAGE, HAND;  Surgeon: Lj Montgomery M.D.;  Location: SURGERY [facility];  Service: Orthopedics    CARPAL TUNNEL RELEASE Right 6/23/2023    Procedure: RELEASE, CARPAL TUNNEL;  Surgeon: Lj Montgomery M.D.;  Location: SURGERY [facility];  Service: Orthopedics     Family History   Problem Relation Age of Onset    No Known Problems Mother     No Known Problems Father      Social History     Socioeconomic History    Marital status: Single     Spouse name: Not on file    Number of children: Not on file    Years of education: Not on file    Highest education level: Not on file   Occupational History    Not on file   Tobacco Use    Smoking status: Never    Smokeless tobacco: Never   Vaping Use    Vaping Use: Never used   Substance and Sexual Activity    Alcohol use: No    Drug use: No    Sexual activity: Not on file   Other Topics Concern    Not on file   Social History Narrative    Not on file     Social Determinants of Health     Financial Resource Strain: Not on file   Food Insecurity: Not on file   Transportation Needs: Not on file   Physical Activity: Not on file   Stress: Not on file   Social Connections: Not on file   Intimate Partner Violence: Not on file   Housing Stability: Not on file        "    Allergies/Intolerances:  No Known Allergies    ROS:   Review of Systems   Constitutional:  Negative for chills, fever, malaise/fatigue and weight loss.   HENT:  Negative for congestion and hearing loss.    Eyes:  Negative for blurred vision and double vision.   Respiratory:  Negative for cough, sputum production, shortness of breath and wheezing.    Cardiovascular:  Negative for chest pain and palpitations.   Gastrointestinal:  Negative for abdominal pain, nausea and vomiting.   Genitourinary:  Negative for dysuria.   Musculoskeletal:  Negative for myalgias.   Skin:  Negative for itching and rash.   Neurological:  Negative for dizziness, focal weakness and headaches.   Endo/Heme/Allergies:  Does not bruise/bleed easily.   Psychiatric/Behavioral:  The patient is nervous/anxious.       ROS was reviewed and were negative except as above.    Objective    Most Recent Vital Signs:  Blood Pressure (Abnormal) 130/90 (BP Location: Left arm, Patient Position: Sitting, BP Cuff Size: Adult)   Pulse (Abnormal) 110   Temperature (Abnormal) 35.8 °C (96.4 °F) (Temporal)   Respiration 16   Height 1.88 m (6' 2\")   Weight 85.5 kg (188 lb 7.9 oz)   Oxygen Saturation 96%   Body Mass Index 24.20 kg/m²     Physical Exam:  Physical Exam  Vitals reviewed.   Constitutional:       Appearance: Normal appearance.   HENT:      Head: Normocephalic and atraumatic.      Nose: Nose normal.      Mouth/Throat:      Mouth: Mucous membranes are moist.   Eyes:      Pupils: Pupils are equal, round, and reactive to light.   Cardiovascular:      Rate and Rhythm: Regular rhythm. Tachycardia present.   Pulmonary:      Effort: Pulmonary effort is normal.      Breath sounds: Normal breath sounds.   Chest:      Chest wall: No tenderness.   Abdominal:      Palpations: Abdomen is soft.   Musculoskeletal:         General: No tenderness.      Cervical back: Normal range of motion and neck supple.      Comments: Right wrist and orthopedic wrap with " Ortho-Glass.  Right thumb site where previous wound is completely healed.  Movement in right hand fingers decreased range of motion   Skin:     General: Skin is warm and dry.      Coloration: Skin is not jaundiced.      Findings: No erythema or rash.   Neurological:      Mental Status: He is alert and oriented to person, place, and time.      Motor: No weakness.   Psychiatric:         Mood and Affect: Mood is anxious.         Behavior: Behavior normal.          Pertinent Lab/Imaging Results:  [unfilled]  @CMP@  WBC   Date/Time Value Ref Range Status   06/26/2023 02:28 AM 11.2 (H) 4.8 - 10.8 K/uL Final     RBC   Date/Time Value Ref Range Status   06/26/2023 02:28 AM 4.62 (L) 4.70 - 6.10 M/uL Final     Hemoglobin   Date/Time Value Ref Range Status   06/26/2023 02:28 AM 14.2 14.0 - 18.0 g/dL Final     Hematocrit   Date/Time Value Ref Range Status   06/26/2023 02:28 AM 41.0 (L) 42.0 - 52.0 % Final     MCV   Date/Time Value Ref Range Status   06/26/2023 02:28 AM 88.7 81.4 - 97.8 fL Final     MCH   Date/Time Value Ref Range Status   06/26/2023 02:28 AM 30.7 27.0 - 33.0 pg Final     MCHC   Date/Time Value Ref Range Status   06/26/2023 02:28 AM 34.6 32.3 - 36.5 g/dL Final     Comment:     Please note new reference range effective 05/22/2023.     MPV   Date/Time Value Ref Range Status   06/26/2023 02:28 AM 10.4 9.0 - 12.9 fL Final      Sodium   Date/Time Value Ref Range Status   06/26/2023 02:28  (L) 135 - 145 mmol/L Final     Potassium   Date/Time Value Ref Range Status   06/26/2023 02:28 AM 3.7 3.6 - 5.5 mmol/L Final     Chloride   Date/Time Value Ref Range Status   06/26/2023 02:28 AM 99 96 - 112 mmol/L Final     Co2   Date/Time Value Ref Range Status   06/26/2023 02:28 AM 23 20 - 33 mmol/L Final     Glucose   Date/Time Value Ref Range Status   06/26/2023 02:28  (H) 65 - 99 mg/dL Final     Bun   Date/Time Value Ref Range Status   06/26/2023 02:28 AM 9 8 - 22 mg/dL Final     Creatinine   Date/Time Value Ref  Range Status   06/26/2023 02:28 AM 0.77 0.50 - 1.40 mg/dL Final     Alkaline Phosphatase   Date/Time Value Ref Range Status   06/23/2023 01:10 AM 80 30 - 99 U/L Final     AST(SGOT)   Date/Time Value Ref Range Status   06/23/2023 01:10 AM 37 12 - 45 U/L Final     ALT(SGPT)   Date/Time Value Ref Range Status   06/23/2023 01:10 AM 42 2 - 50 U/L Final     Total Bilirubin   Date/Time Value Ref Range Status   06/23/2023 01:10 AM 1.1 0.1 - 1.5 mg/dL Final      CPK Total   Date/Time Value Ref Range Status   06/23/2023 01:10  (H) 0 - 154 U/L Final          Blood Culture   Date Value Ref Range Status   01/29/2018 No growth after 5 days of incubation.  Final       Blood Culture   Date Value Ref Range Status   01/29/2018 No growth after 5 days of incubation.  Final          CULTURE WOUND W/ GRAM STAIN  Order: [id number]  Status: Final result     Visible to patient: Yes (not seen)     Next appt: 07/14/2023 at 10:30 AM in Infectious Diseases (Lupillo Gandhi A.P.R.N.)     Specimen Information: Wound   0 Result Notes      Component 2 wk ago   Significant Indicator POS Positive (POS)    Source WND    Site Right Carpal Tunnel Incision    Culture Result - Abnormal     Gram Stain Result Moderate WBCs.   No organisms seen.    Culture Result  Abnormal   Beta Streptococcus Group C   Light growth     Resulting Agency M              Specimen Collected: 06/23/23  8:00 AM Last Resulted: 06/25/23  8:41 AM           Impression/Assessment      1. Cellulitis and abscess of hand  CBC WITH DIFFERENTIAL    Comp Metabolic Panel      2. Bacterial infection due to Streptococcus, group C        3. Tenosynovitis of hand        4. History of drug abuse (HCC)        5. Tachycardia            Vishal Parrish is a 52 y.o.  admitted 6/22/2023. Pt has a past medical history of right hand and wrist pain ongoing for 3 days.  Patient also endorses history of drug use, denies any IV drug use and states he has not used in ~ year.  In the ED we was febrile to  100.9 on 6/24 and now improved.  CT on 6/22 with enhancing fluid collection adjacent to the distal ulna thought to represent small tissue abscess, edema.  The patient went to the OR on 6/23 for I&D of right hand abscess and right carpal tunnel release OP report: flexor tendons were significantly swollen with evidence of tenosynovitis and there was a lot of pressure in the carpal tunnel.  Upon release of the carpal tunnels purulent fluid began to drain out. OR cultures + group C Streptococcus. Plan at discharge was Po Augmentin 875/25 twice daily 4-week course, end date 7/24/23.     07/14/23-  Movement in his right hand has been slowly improving.  Tolerating p.o. Augmentin with no complaints of side effects.  He is scheduled follow-up with orthopedic surgery next Friday for suture removal and wound eval. Previous right thumb wound site completely healed.  PLAN:   - Continue Po Augmentin 875/125 mg BID until 7/24/23.  4-week course  for tenosynovitis   - Medication education provided and S/S of side effects discussed   - Repeat Labs CBC/CMP today. Most recent labs reviewed from 6/26/2023, Leukos cytosis 11.2 but trending down, CMP mostly unremarkable besides slightly hyponatremia 133  - Recommend routine follow up with orthopedic surgery for suture removal and eval   - Continue wound care to right hand surgical site  -Tachycardia due to nervousness.  Patient to monitor heart rate and if it continues to increase after visit he will report to ER  - Education provided on S/S of infection and when to report to ER/ call [phone]     Return visit: PRN. Follow up with primary care physician for chronic medical problems      I have performed a physical exam,  updated ROS and plan today. I have reviewed previous images, labs, and provider notes.      VANNA Gilman.    All Patients should seek medical re-evaluation or report to the ER for new, increasing or worsening symptoms. In some circumstances medical conditions can  change from the initial evaluation and may require emergent medical re-evaluation. This includes but is not limited to chest pain, shortness of breath, atypical abdominal pain, atypical headache, ALOC, fever >101, low blood pressure, high respiratory rate (above 30), low oxygen saturation (below 90%), acute delirium, abnormal bleeding, inability to tolerate any intake, weakness on one side of the body, any worsened or concerning conditions.    Please note that this dictation was created using voice recognition software. I have worked with technical experts from [facility], [facility] to optimize the interface.  I have made every reasonable attempt to correct obvious errors, but there may be errors of grammar and possibly content that I did not discover before finalizing the note.

## (undated) DEVICE — SENSOR OXIMETER ADULT SPO2 RD SET (20EA/BX)

## (undated) DEVICE — TOURNIQUET CUFF 18 X 3 ONE PORT DISP - STERILE (10/BX)

## (undated) DEVICE — TUBING CLEARLINK DUO-VENT - C-FLO (48EA/CA)

## (undated) DEVICE — ELECTRODE DUAL RETURN W/ CORD - (50/PK)

## (undated) DEVICE — PADDING CAST 4 IN STERILE - 4 X 4 YDS (24/CA)

## (undated) DEVICE — PACK UPPER EXTREMITY (2EA/CA)

## (undated) DEVICE — LACTATED RINGERS INJ 1000 ML - (14EA/CA 60CA/PF)

## (undated) DEVICE — SUTURE GENERAL

## (undated) DEVICE — SET EXTENSION WITH 2 PORTS (48EA/CA) ***PART #2C8610 IS A SUBSTITUTE*****

## (undated) DEVICE — SET LEADWIRE 5 LEAD BEDSIDE DISPOSABLE ECG (1SET OF 5/EA)

## (undated) DEVICE — BANDAGE ELASTIC 4 HONEYCOMB - 4"X5YD LF (20/CA)"

## (undated) DEVICE — SUCTION INSTRUMENT YANKAUER BULBOUS TIP W/O VENT (50EA/CA)

## (undated) DEVICE — SPONGE GAUZESTER 4 X 4 4PLY - (128PK/CA)

## (undated) DEVICE — GOWN WARMING STANDARD FLEX - (30/CA)

## (undated) DEVICE — COVER LIGHT HANDLE ALC PLUS DISP (18EA/BX)

## (undated) DEVICE — PACK LOWER EXTREMITY - (2/CA)

## (undated) DEVICE — CANISTER SUCTION 3000ML MECHANICAL FILTER AUTO SHUTOFF MEDI-VAC NONSTERILE LF DISP  (40EA/CA)

## (undated) DEVICE — SODIUM CHL IRRIGATION 0.9% 1000ML (12EA/CA)

## (undated) DEVICE — CHLORAPREP 26 ML APPLICATOR - ORANGE TINT(25/CA)

## (undated) DEVICE — SODIUM CHL. IRRIGATION 0.9% 3000ML (4EA/CA 65CA/PF)